# Patient Record
Sex: MALE | Race: WHITE | NOT HISPANIC OR LATINO | Employment: OTHER | URBAN - METROPOLITAN AREA
[De-identification: names, ages, dates, MRNs, and addresses within clinical notes are randomized per-mention and may not be internally consistent; named-entity substitution may affect disease eponyms.]

---

## 2022-06-24 ENCOUNTER — HOSPITAL ENCOUNTER (EMERGENCY)
Facility: HOSPITAL | Age: 67
Discharge: HOME | End: 2022-06-24
Attending: EMERGENCY MEDICINE
Payer: MEDICARE

## 2022-06-24 VITALS
BODY MASS INDEX: 44.1 KG/M2 | HEART RATE: 70 BPM | OXYGEN SATURATION: 100 % | WEIGHT: 315 LBS | HEIGHT: 71 IN | SYSTOLIC BLOOD PRESSURE: 140 MMHG | RESPIRATION RATE: 34 BRPM | DIASTOLIC BLOOD PRESSURE: 95 MMHG | TEMPERATURE: 98 F

## 2022-06-24 DIAGNOSIS — N39.0 URINARY TRACT INFECTION WITHOUT HEMATURIA, SITE UNSPECIFIED: ICD-10-CM

## 2022-06-24 DIAGNOSIS — R55 NEAR SYNCOPE: ICD-10-CM

## 2022-06-24 DIAGNOSIS — I16.0 HYPERTENSIVE URGENCY: Primary | ICD-10-CM

## 2022-06-24 LAB
ALBUMIN SERPL BCP-MCNC: 4 G/DL (ref 3.5–5.2)
ALP SERPL-CCNC: 108 U/L (ref 55–135)
ALT SERPL W/O P-5'-P-CCNC: 24 U/L (ref 10–44)
ANION GAP SERPL CALC-SCNC: 10 MMOL/L (ref 8–16)
AST SERPL-CCNC: 18 U/L (ref 10–40)
BACTERIA #/AREA URNS HPF: ABNORMAL /HPF
BASOPHILS # BLD AUTO: 0.05 K/UL (ref 0–0.2)
BASOPHILS NFR BLD: 0.6 % (ref 0–1.9)
BILIRUB SERPL-MCNC: 0.6 MG/DL (ref 0.1–1)
BILIRUB UR QL STRIP: NEGATIVE
BUN SERPL-MCNC: 22 MG/DL (ref 8–23)
CALCIUM SERPL-MCNC: 10.4 MG/DL (ref 8.7–10.5)
CHLORIDE SERPL-SCNC: 106 MMOL/L (ref 95–110)
CLARITY UR: CLEAR
CO2 SERPL-SCNC: 24 MMOL/L (ref 23–29)
COLOR UR: YELLOW
CREAT SERPL-MCNC: 1.7 MG/DL (ref 0.5–1.4)
DIFFERENTIAL METHOD: ABNORMAL
EOSINOPHIL # BLD AUTO: 0.2 K/UL (ref 0–0.5)
EOSINOPHIL NFR BLD: 2.7 % (ref 0–8)
ERYTHROCYTE [DISTWIDTH] IN BLOOD BY AUTOMATED COUNT: 13.1 % (ref 11.5–14.5)
EST. GFR  (AFRICAN AMERICAN): 47.9 ML/MIN/1.73 M^2
EST. GFR  (NON AFRICAN AMERICAN): 41.4 ML/MIN/1.73 M^2
GLUCOSE SERPL-MCNC: 204 MG/DL (ref 70–110)
GLUCOSE UR QL STRIP: NEGATIVE
HCT VFR BLD AUTO: 44.3 % (ref 40–54)
HGB BLD-MCNC: 14.6 G/DL (ref 14–18)
HGB UR QL STRIP: NEGATIVE
IMM GRANULOCYTES # BLD AUTO: 0.04 K/UL (ref 0–0.04)
IMM GRANULOCYTES NFR BLD AUTO: 0.5 % (ref 0–0.5)
KETONES UR QL STRIP: NEGATIVE
LEUKOCYTE ESTERASE UR QL STRIP: ABNORMAL
LYMPHOCYTES # BLD AUTO: 2.6 K/UL (ref 1–4.8)
LYMPHOCYTES NFR BLD: 34.1 % (ref 18–48)
MAGNESIUM SERPL-MCNC: 1.9 MG/DL (ref 1.6–2.6)
MCH RBC QN AUTO: 31.2 PG (ref 27–31)
MCHC RBC AUTO-ENTMCNC: 33 G/DL (ref 32–36)
MCV RBC AUTO: 95 FL (ref 82–98)
MICROSCOPIC COMMENT: ABNORMAL
MONOCYTES # BLD AUTO: 0.7 K/UL (ref 0.3–1)
MONOCYTES NFR BLD: 9.5 % (ref 4–15)
NEUTROPHILS # BLD AUTO: 4.1 K/UL (ref 1.8–7.7)
NEUTROPHILS NFR BLD: 52.6 % (ref 38–73)
NITRITE UR QL STRIP: NEGATIVE
NRBC BLD-RTO: 0 /100 WBC
PH UR STRIP: 6 [PH] (ref 5–8)
PLATELET # BLD AUTO: 227 K/UL (ref 150–450)
PMV BLD AUTO: 9.8 FL (ref 9.2–12.9)
POCT GLUCOSE: 210 MG/DL (ref 70–110)
POTASSIUM SERPL-SCNC: 3.5 MMOL/L (ref 3.5–5.1)
PROT SERPL-MCNC: 7.4 G/DL (ref 6–8.4)
PROT UR QL STRIP: NEGATIVE
RBC # BLD AUTO: 4.68 M/UL (ref 4.6–6.2)
RBC #/AREA URNS HPF: 2 /HPF (ref 0–4)
SODIUM SERPL-SCNC: 140 MMOL/L (ref 136–145)
SP GR UR STRIP: 1.02 (ref 1–1.03)
TROPONIN I SERPL DL<=0.01 NG/ML-MCNC: <0.006 NG/ML (ref 0–0.03)
URN SPEC COLLECT METH UR: ABNORMAL
UROBILINOGEN UR STRIP-ACNC: NEGATIVE EU/DL
WBC # BLD AUTO: 7.75 K/UL (ref 3.9–12.7)
WBC #/AREA URNS HPF: 6 /HPF (ref 0–5)

## 2022-06-24 PROCEDURE — 83735 ASSAY OF MAGNESIUM: CPT | Performed by: EMERGENCY MEDICINE

## 2022-06-24 PROCEDURE — 71045 XR CHEST 1 VIEW: ICD-10-PCS | Mod: 26,,, | Performed by: RADIOLOGY

## 2022-06-24 PROCEDURE — 80053 COMPREHEN METABOLIC PANEL: CPT | Performed by: EMERGENCY MEDICINE

## 2022-06-24 PROCEDURE — 71045 X-RAY EXAM CHEST 1 VIEW: CPT | Mod: 26,,, | Performed by: RADIOLOGY

## 2022-06-24 PROCEDURE — 70450 CT HEAD WITHOUT CONTRAST: ICD-10-PCS | Mod: 26,,, | Performed by: RADIOLOGY

## 2022-06-24 PROCEDURE — 81000 URINALYSIS NONAUTO W/SCOPE: CPT | Performed by: EMERGENCY MEDICINE

## 2022-06-24 PROCEDURE — 93005 ELECTROCARDIOGRAM TRACING: CPT

## 2022-06-24 PROCEDURE — 70450 CT HEAD/BRAIN W/O DYE: CPT | Mod: 26,,, | Performed by: RADIOLOGY

## 2022-06-24 PROCEDURE — 99285 EMERGENCY DEPT VISIT HI MDM: CPT | Mod: 25

## 2022-06-24 PROCEDURE — 96361 HYDRATE IV INFUSION ADD-ON: CPT

## 2022-06-24 PROCEDURE — 96375 TX/PRO/DX INJ NEW DRUG ADDON: CPT

## 2022-06-24 PROCEDURE — 85025 COMPLETE CBC W/AUTO DIFF WBC: CPT | Performed by: EMERGENCY MEDICINE

## 2022-06-24 PROCEDURE — 93010 EKG 12-LEAD: ICD-10-PCS | Mod: ,,, | Performed by: INTERNAL MEDICINE

## 2022-06-24 PROCEDURE — 70450 CT HEAD/BRAIN W/O DYE: CPT | Mod: TC

## 2022-06-24 PROCEDURE — 71045 X-RAY EXAM CHEST 1 VIEW: CPT | Mod: TC,FY

## 2022-06-24 PROCEDURE — 25000003 PHARM REV CODE 250: Performed by: EMERGENCY MEDICINE

## 2022-06-24 PROCEDURE — 96374 THER/PROPH/DIAG INJ IV PUSH: CPT

## 2022-06-24 PROCEDURE — 84484 ASSAY OF TROPONIN QUANT: CPT | Performed by: EMERGENCY MEDICINE

## 2022-06-24 PROCEDURE — 63600175 PHARM REV CODE 636 W HCPCS: Performed by: EMERGENCY MEDICINE

## 2022-06-24 PROCEDURE — 93010 ELECTROCARDIOGRAM REPORT: CPT | Mod: ,,, | Performed by: INTERNAL MEDICINE

## 2022-06-24 RX ORDER — CLONIDINE HYDROCHLORIDE 0.1 MG/1
0.1 TABLET ORAL
Status: COMPLETED | OUTPATIENT
Start: 2022-06-24 | End: 2022-06-24

## 2022-06-24 RX ORDER — HYDROCODONE BITARTRATE AND ACETAMINOPHEN 10; 325 MG/1; MG/1
TABLET ORAL
COMMUNITY
Start: 2022-06-17 | End: 2022-10-06

## 2022-06-24 RX ORDER — NALOXONE HCL 0.4 MG/ML
1 VIAL (ML) INJECTION
Status: COMPLETED | OUTPATIENT
Start: 2022-06-24 | End: 2022-06-24

## 2022-06-24 RX ORDER — COLCHICINE 0.6 MG/1
CAPSULE ORAL
COMMUNITY
Start: 2022-05-19

## 2022-06-24 RX ORDER — PRAVASTATIN SODIUM 20 MG/1
1 TABLET ORAL NIGHTLY
COMMUNITY
Start: 2022-06-17

## 2022-06-24 RX ORDER — NITROFURANTOIN 25; 75 MG/1; MG/1
100 CAPSULE ORAL 2 TIMES DAILY
Qty: 14 CAPSULE | Refills: 0 | Status: SHIPPED | OUTPATIENT
Start: 2022-06-24 | End: 2022-07-01

## 2022-06-24 RX ORDER — CHOLECALCIFEROL (VITAMIN D3) 25 MCG
1000 TABLET ORAL
COMMUNITY

## 2022-06-24 RX ORDER — ALLOPURINOL 100 MG/1
200 TABLET ORAL
COMMUNITY
Start: 2022-06-17 | End: 2023-06-17

## 2022-06-24 RX ORDER — FUROSEMIDE 40 MG/1
40 TABLET ORAL DAILY PRN
COMMUNITY
Start: 2022-06-17

## 2022-06-24 RX ORDER — ONDANSETRON 2 MG/ML
4 INJECTION INTRAMUSCULAR; INTRAVENOUS
Status: COMPLETED | OUTPATIENT
Start: 2022-06-24 | End: 2022-06-24

## 2022-06-24 RX ORDER — BENAZEPRIL HYDROCHLORIDE 20 MG/1
20 TABLET ORAL 2 TIMES DAILY
COMMUNITY
Start: 2022-05-19

## 2022-06-24 RX ORDER — METOPROLOL SUCCINATE 25 MG/1
25 TABLET, EXTENDED RELEASE ORAL DAILY
COMMUNITY
Start: 2022-05-18

## 2022-06-24 RX ORDER — APIXABAN 5 MG/1
5 TABLET, FILM COATED ORAL 2 TIMES DAILY
COMMUNITY
Start: 2022-06-14

## 2022-06-24 RX ORDER — HYDRALAZINE HYDROCHLORIDE 20 MG/ML
10 INJECTION INTRAMUSCULAR; INTRAVENOUS
Status: COMPLETED | OUTPATIENT
Start: 2022-06-24 | End: 2022-06-24

## 2022-06-24 RX ORDER — GLIPIZIDE 5 MG/1
5 TABLET, FILM COATED, EXTENDED RELEASE ORAL DAILY
COMMUNITY
Start: 2022-04-25

## 2022-06-24 RX ADMIN — ONDANSETRON 4 MG: 2 INJECTION INTRAMUSCULAR; INTRAVENOUS at 11:06

## 2022-06-24 RX ADMIN — SODIUM CHLORIDE 500 ML: 9 INJECTION, SOLUTION INTRAVENOUS at 11:06

## 2022-06-24 RX ADMIN — NALOXONE HYDROCHLORIDE 1 MG: 0.4 INJECTION, SOLUTION INTRAMUSCULAR; INTRAVENOUS; SUBCUTANEOUS at 11:06

## 2022-06-24 RX ADMIN — CLONIDINE HYDROCHLORIDE 0.1 MG: 0.1 TABLET ORAL at 12:06

## 2022-06-24 RX ADMIN — HYDRALAZINE HYDROCHLORIDE 10 MG: 20 INJECTION, SOLUTION INTRAMUSCULAR; INTRAVENOUS at 12:06

## 2022-06-24 NOTE — ED TRIAGE NOTES
Pt states that this morning he took his medications out of his organizer pt states that he believes he took the wrong organizer slot and took 2 extra oxycodone 5mg. Pt states that he is having increased sweating and that he has a headache. Pt is diaphoretic in triage. Pt states that he also has nausea.

## 2022-06-24 NOTE — ED PROVIDER NOTES
Encounter Date: 6/24/2022       History     Chief Complaint   Patient presents with    took wrong medication     Pt with hx of HTN here with c/o nausea, lightheadedness and diaphoresis not long after accidentally taking 2 extra pain pills this am. No LOC. He feels like he will pass out when he stands. No CP/SOB. No focal weakness or numbness.     The history is provided by the patient.     Review of patient's allergies indicates:  No Known Allergies  No past medical history on file.  No past surgical history on file.  No family history on file.     Review of Systems   Constitutional: Positive for diaphoresis. Negative for chills and fever.   HENT: Negative for sore throat and tinnitus.    Eyes: Negative for photophobia.   Respiratory: Negative for shortness of breath.    Cardiovascular: Negative for chest pain, palpitations and leg swelling.   Gastrointestinal: Negative for abdominal pain.   Endocrine: Negative for polydipsia and polyuria.   Genitourinary: Negative for decreased urine volume.   Musculoskeletal: Negative for back pain.   Neurological: Positive for dizziness and light-headedness. Negative for seizures, syncope and headaches.   All other systems reviewed and are negative.      Physical Exam     Initial Vitals   BP Pulse Resp Temp SpO2   06/24/22 1041 06/24/22 1037 06/24/22 1037 06/24/22 1041 06/24/22 1037   (!) 192/117 77 18 97.9 °F (36.6 °C) 96 %      MAP       --                Physical Exam    Constitutional: He appears well-developed and well-nourished. He is diaphoretic. No distress.   Mildly ill appearing obese male   HENT:   Mouth/Throat: Oropharynx is clear and moist.   Eyes: Conjunctivae and EOM are normal. Pupils are equal, round, and reactive to light. No scleral icterus.   Neck: Neck supple. No thyromegaly present.   Normal range of motion.  Cardiovascular: Normal rate, regular rhythm, normal heart sounds and intact distal pulses.   Pulmonary/Chest: Breath sounds normal.   Abdominal:  Abdomen is soft. There is no abdominal tenderness.   Musculoskeletal:         General: No tenderness or edema. Normal range of motion.      Cervical back: Normal range of motion and neck supple.     Neurological: He is alert and oriented to person, place, and time. GCS score is 15. GCS eye subscore is 4. GCS verbal subscore is 5. GCS motor subscore is 6.   Skin: Skin is warm. Capillary refill takes less than 2 seconds. There is pallor.         ED Course   Procedures  Labs Reviewed   CBC W/ AUTO DIFFERENTIAL - Abnormal; Notable for the following components:       Result Value    MCH 31.2 (*)     All other components within normal limits   COMPREHENSIVE METABOLIC PANEL - Abnormal; Notable for the following components:    Glucose 204 (*)     Creatinine 1.7 (*)     eGFR if  47.9 (*)     eGFR if non  41.4 (*)     All other components within normal limits   URINALYSIS, REFLEX TO URINE CULTURE - Abnormal; Notable for the following components:    Leukocytes, UA Trace (*)     All other components within normal limits    Narrative:     Preferred Collection Type->Urine, Clean Catch  Specimen Source->Urine   URINALYSIS MICROSCOPIC - Abnormal; Notable for the following components:    WBC, UA 6 (*)     All other components within normal limits    Narrative:     Preferred Collection Type->Urine, Clean Catch  Specimen Source->Urine   POCT GLUCOSE - Abnormal; Notable for the following components:    POCT Glucose 210 (*)     All other components within normal limits   MAGNESIUM   TROPONIN I     EKG Readings: (Independently Interpreted)   Rhythm: Atrial Fibrillation. Heart Rate: 56. Ectopy: No Ectopy. Conduction: Normal. ST Segments: Normal ST Segments. T Waves: Normal. Clinical Impression: Atrial Fibrillation       Imaging Results          CT Head Without Contrast (Final result)  Result time 06/24/22 12:47:13    Final result by Chantel Garcia MD (06/24/22 12:47:13)                 Impression:      No  acute abnormality.  Minor changes of chronic microvascular ischemia.      Electronically signed by: Chantel Garcia  Date:    06/24/2022  Time:    12:47             Narrative:    EXAMINATION:  CT HEAD WITHOUT CONTRAST    CLINICAL HISTORY:  Mental status change, unknown cause; near syncope    TECHNIQUE:  Low dose axial images were obtained through the head.  Coronal and sagittal reformations were also performed. Contrast was not administered.    COMPARISON:  None    FINDINGS:  There is no intra or extra-axial hemorrhage.  No mass effect, edema or midline shift is present.  The ventricular system and cortical sulcal markings are appropriate for the patient's age.  There is no acute or chronic infarction.  Minor periventricular low densities of chronic microvascular ischemia are present.    There is no skull fracture.  The visualized paranasal sinuses are clear.                               X-Ray Chest 1 View (Final result)  Result time 06/24/22 11:49:31    Final result by Chantel Garcia MD (06/24/22 11:49:31)                 Impression:      Cardiomegaly and pulmonary venous hypertension.      Electronically signed by: Chantel Garcia  Date:    06/24/2022  Time:    11:49             Narrative:    EXAMINATION:  XR CHEST 1 VIEW    CLINICAL HISTORY:  Syncope and collapse    TECHNIQUE:  Single frontal view of the chest was performed.    COMPARISON:  None    FINDINGS:  There is enlargement of the cardiac silhouette.  Cephalization of pulmonary blood flow is noted without shayan pulmonary edema.  Lungs are otherwise clear.  There is no pleural effusion or pneumothorax.                                 Medications   ondansetron injection 4 mg (4 mg Intravenous Given 6/24/22 1133)   sodium chloride 0.9% bolus 500 mL (500 mLs Intravenous New Bag 6/24/22 1130)   naloxone 0.4 mg/mL injection 1 mg (1 mg Intravenous Given 6/24/22 1136)   hydrALAZINE injection 10 mg (10 mg Intravenous Given 6/24/22 1246)   cloNIDine  tablet 0.1 mg (0.1 mg Oral Given 6/24/22 1245)     Medical Decision Making:   Clinical Tests:   Lab Tests: Ordered  Radiological Study: Ordered  Medical Tests: Ordered  ED Management:  Pt presented after near syncope after accidentally taking extra narcotic pills this am. Pt initially pale and diaphoretic. He improved with IVFs and narcan. CBC, CMP, troponin and EKG all unremarkable. His UA c/w mild UTI. Pt's color returned to normal and his symptoms resolved. His BP remained elevated and he was given hydralazine and clonidine with improvement. Pt remained asymptomatic the rest of his stay. I offered to keep him over night for observation but pt stated that he felt much better and wanted to go home. Return precautions discussed. He was advised to f/u with his PCP early next week for BP med mgt.             ED Course as of 06/24/22 1331   Fri Jun 24, 2022   1057 Likely vagal 2/2 the opiate. Plan labs and small NS bolus. EKG pending. [DC]   1113 Pt now in Afib. He reports hx of same and takes eliquis. [DC]   1233 Pt feeling back to baseline. His color is now normal. He remains hypertensive, now in 200s. Will treat this.  [DC]   1239 Pt ambulated to RR without difficulty. No further dizziness. [DC]      ED Course User Index  [DC] Sidney Bynum Jr., MD             Clinical Impression:   Final diagnoses:  [R55] Near syncope  [I16.0] Hypertensive urgency (Primary)  [N39.0] Urinary tract infection without hematuria, site unspecified          ED Disposition Condition    Discharge Stable        ED Prescriptions     Medication Sig Dispense Start Date End Date Auth. Provider    nitrofurantoin, macrocrystal-monohydrate, (MACROBID) 100 MG capsule Take 1 capsule (100 mg total) by mouth 2 (two) times daily. for 7 days 14 capsule 6/24/2022 7/1/2022 Sidney Bynum Jr., MD        Follow-up Information     Follow up With Specialties Details Why Contact Info    Rosalinda Stockton NP Family Medicine Schedule an appointment as soon as  possible for a visit in 3 days  [address]  [place] [address]  [phone]             Sidney Bynum Jr., MD  06/24/22 1179

## 2022-08-16 ENCOUNTER — OFFICE VISIT (OUTPATIENT)
Dept: FAMILY MEDICINE | Facility: CLINIC | Age: 67
End: 2022-08-16
Payer: MEDICARE

## 2022-08-16 VITALS
HEIGHT: 71 IN | WEIGHT: 315 LBS | RESPIRATION RATE: 18 BRPM | DIASTOLIC BLOOD PRESSURE: 80 MMHG | BODY MASS INDEX: 44.1 KG/M2 | OXYGEN SATURATION: 95 % | SYSTOLIC BLOOD PRESSURE: 128 MMHG | HEART RATE: 61 BPM

## 2022-08-16 DIAGNOSIS — G89.29 CHRONIC PAIN OF LEFT KNEE: ICD-10-CM

## 2022-08-16 DIAGNOSIS — M17.12 PRIMARY OSTEOARTHRITIS OF LEFT KNEE: Primary | ICD-10-CM

## 2022-08-16 DIAGNOSIS — M25.562 CHRONIC PAIN OF LEFT KNEE: ICD-10-CM

## 2022-08-16 PROCEDURE — 4010F ACE/ARB THERAPY RXD/TAKEN: CPT | Mod: CPTII,S$GLB,, | Performed by: FAMILY MEDICINE

## 2022-08-16 PROCEDURE — 99203 PR OFFICE/OUTPT VISIT, NEW, LEVL III, 30-44 MIN: ICD-10-PCS | Mod: S$GLB,,, | Performed by: FAMILY MEDICINE

## 2022-08-16 PROCEDURE — 1159F MED LIST DOCD IN RCRD: CPT | Mod: CPTII,S$GLB,, | Performed by: FAMILY MEDICINE

## 2022-08-16 PROCEDURE — 1101F PR PT FALLS ASSESS DOC 0-1 FALLS W/OUT INJ PAST YR: ICD-10-PCS | Mod: CPTII,S$GLB,, | Performed by: FAMILY MEDICINE

## 2022-08-16 PROCEDURE — 3008F PR BODY MASS INDEX (BMI) DOCUMENTED: ICD-10-PCS | Mod: CPTII,S$GLB,, | Performed by: FAMILY MEDICINE

## 2022-08-16 PROCEDURE — 3079F PR MOST RECENT DIASTOLIC BLOOD PRESSURE 80-89 MM HG: ICD-10-PCS | Mod: CPTII,S$GLB,, | Performed by: FAMILY MEDICINE

## 2022-08-16 PROCEDURE — 4010F PR ACE/ARB THEARPY RXD/TAKEN: ICD-10-PCS | Mod: CPTII,S$GLB,, | Performed by: FAMILY MEDICINE

## 2022-08-16 PROCEDURE — 1159F PR MEDICATION LIST DOCUMENTED IN MEDICAL RECORD: ICD-10-PCS | Mod: CPTII,S$GLB,, | Performed by: FAMILY MEDICINE

## 2022-08-16 PROCEDURE — 3008F BODY MASS INDEX DOCD: CPT | Mod: CPTII,S$GLB,, | Performed by: FAMILY MEDICINE

## 2022-08-16 PROCEDURE — 99999 PR PBB SHADOW E&M-EST. PATIENT-LVL IV: ICD-10-PCS | Mod: PBBFAC,,, | Performed by: FAMILY MEDICINE

## 2022-08-16 PROCEDURE — 3288F FALL RISK ASSESSMENT DOCD: CPT | Mod: CPTII,S$GLB,, | Performed by: FAMILY MEDICINE

## 2022-08-16 PROCEDURE — 99203 OFFICE O/P NEW LOW 30 MIN: CPT | Mod: S$GLB,,, | Performed by: FAMILY MEDICINE

## 2022-08-16 PROCEDURE — 99999 PR PBB SHADOW E&M-EST. PATIENT-LVL IV: CPT | Mod: PBBFAC,,, | Performed by: FAMILY MEDICINE

## 2022-08-16 PROCEDURE — 3288F PR FALLS RISK ASSESSMENT DOCUMENTED: ICD-10-PCS | Mod: CPTII,S$GLB,, | Performed by: FAMILY MEDICINE

## 2022-08-16 PROCEDURE — 3079F DIAST BP 80-89 MM HG: CPT | Mod: CPTII,S$GLB,, | Performed by: FAMILY MEDICINE

## 2022-08-16 PROCEDURE — 3074F SYST BP LT 130 MM HG: CPT | Mod: CPTII,S$GLB,, | Performed by: FAMILY MEDICINE

## 2022-08-16 PROCEDURE — 3074F PR MOST RECENT SYSTOLIC BLOOD PRESSURE < 130 MM HG: ICD-10-PCS | Mod: CPTII,S$GLB,, | Performed by: FAMILY MEDICINE

## 2022-08-16 PROCEDURE — 1101F PT FALLS ASSESS-DOCD LE1/YR: CPT | Mod: CPTII,S$GLB,, | Performed by: FAMILY MEDICINE

## 2022-08-16 NOTE — PROGRESS NOTES
[facility] - Clinic Note    Subjective      Mr. Barnes is a 67 y.o. male who presents to clinic for knee pain.     Complains of chronic left knee pain  Present for the past few years.   Reports that he has undergone joint steroid injections as well as rooster cane shots with no improvement.   He needs a total knee replacement but needs to lose weight first.   Reports that he is given about 20-30 norcos a month which do not last him and one tablet will usually help for the pain for about 2-3 hours.   He is frustrated as he thought he was seeing pain management today.  Last x-ray was in 4/2022 and revealed: Again noted is severe narrowing of the medial joint space with medial lateral marginal osteophytes. Prominent patellofemoral joint space narrowing and osteophytes. No acute fracture or dislocation. No joint effusion. Soft tissues normal.        PMH Bridgette has a past medical history of Atrial fibrillation, Gout, unspecified, Hyperlipidemia, and Hypertension.   PSXH Bridgette has a past surgical history that includes Iliac artery stent.    Bridgette's family history includes Cancer in his father and mother; Lung cancer in his father and mother.   SH Bridgette reports that he has never smoked. He has never used smokeless tobacco. He reports previous alcohol use. He reports that he does not use drugs.   ALG Bridgette has No Known Allergies.   MED Bridgette has a current medication list which includes the following prescription(s): allopurinol, benazepril, eliquis, furosemide, glipizide, hydrocodone-acetaminophen, metoprolol succinate, mitigare, pravastatin, and vitamin d.     Review of Systems   Constitutional: Negative for activity change, appetite change, chills, fatigue and fever.   Eyes: Negative for visual disturbance.   Respiratory: Negative for cough and shortness of breath.    Cardiovascular: Negative for chest pain, palpitations and leg swelling.   Gastrointestinal: Negative for abdominal pain, nausea and vomiting.  "  Musculoskeletal: Positive for arthralgias.        Chronic left knee pain   Skin: Negative for wound.   Neurological: Negative for dizziness and headaches.   Psychiatric/Behavioral: Negative for confusion.     Objective     /80 (BP Location: Left arm, Patient Position: Sitting, BP Method: Medium (Manual))   Pulse 61   Resp 18   Ht 5' 11" (1.803 m)   Wt (!) 179.2 kg (395 lb)   SpO2 95%   BMI 55.09 kg/m²     Physical Exam   Constitutional: He appears obese.  Non-toxic appearance. He does not appear ill. No distress.   HENT:   Head: Normocephalic and atraumatic.   Eyes: Right eye exhibits no discharge. Left eye exhibits no discharge.   Cardiovascular: Normal rate, regular rhythm, normal heart sounds and normal pulses. Exam reveals no gallop and no friction rub.   No murmur heard.Pulmonary:      Effort: Pulmonary effort is normal. No respiratory distress.      Breath sounds: Normal breath sounds. No wheezing, rhonchi or rales.     Abdominal: Normal appearance.   Musculoskeletal:      Right lower leg: No edema.      Left lower leg: No edema.   Lymphadenopathy:     He has no cervical adenopathy.   Neurological: He is alert.   Skin: Skin is warm and dry. Capillary refill takes less than 2 seconds. He is not diaphoretic.   Psychiatric: His behavior is normal. Mood, judgment and thought content normal.   Vitals reviewed.     Assessment/Plan     Bridgette was seen today for knee pain.    Diagnoses and all orders for this visit:    Primary osteoarthritis of left knee  -     Ambulatory referral/consult to Pain Clinic; Future    Chronic pain of left knee  -     Ambulatory referral/consult to Pain Clinic; Future      -patient is new to me.   -refer to pain management. appt scheduled for patient.   -he will continue care with his primary physician.     Future Appointments   Date Time Provider Department Center   10/6/2022 10:30 AM Reinaldo Burgos MD [address]       William Hernandez MD  Family " Medicine  [facility]

## 2022-09-09 ENCOUNTER — OFFICE VISIT (OUTPATIENT)
Dept: PAIN MEDICINE | Facility: CLINIC | Age: 67
End: 2022-09-09
Payer: MEDICARE

## 2022-09-09 VITALS
DIASTOLIC BLOOD PRESSURE: 84 MMHG | SYSTOLIC BLOOD PRESSURE: 172 MMHG | WEIGHT: 315 LBS | BODY MASS INDEX: 44.1 KG/M2 | HEIGHT: 71 IN | HEART RATE: 61 BPM

## 2022-09-09 DIAGNOSIS — M25.562 CHRONIC PAIN OF LEFT KNEE: ICD-10-CM

## 2022-09-09 DIAGNOSIS — M17.12 PRIMARY OSTEOARTHRITIS OF LEFT KNEE: Primary | ICD-10-CM

## 2022-09-09 DIAGNOSIS — G89.29 CHRONIC PAIN OF LEFT KNEE: ICD-10-CM

## 2022-09-09 PROCEDURE — 99999 PR PBB SHADOW E&M-EST. PATIENT-LVL III: CPT | Mod: PBBFAC,,, | Performed by: ANESTHESIOLOGY

## 2022-09-09 PROCEDURE — 99204 OFFICE O/P NEW MOD 45 MIN: CPT | Mod: S$GLB,,, | Performed by: ANESTHESIOLOGY

## 2022-09-09 PROCEDURE — 3288F FALL RISK ASSESSMENT DOCD: CPT | Mod: CPTII,S$GLB,, | Performed by: ANESTHESIOLOGY

## 2022-09-09 PROCEDURE — 99204 PR OFFICE/OUTPT VISIT, NEW, LEVL IV, 45-59 MIN: ICD-10-PCS | Mod: S$GLB,,, | Performed by: ANESTHESIOLOGY

## 2022-09-09 PROCEDURE — 3288F PR FALLS RISK ASSESSMENT DOCUMENTED: ICD-10-PCS | Mod: CPTII,S$GLB,, | Performed by: ANESTHESIOLOGY

## 2022-09-09 PROCEDURE — 3079F PR MOST RECENT DIASTOLIC BLOOD PRESSURE 80-89 MM HG: ICD-10-PCS | Mod: CPTII,S$GLB,, | Performed by: ANESTHESIOLOGY

## 2022-09-09 PROCEDURE — 1101F PT FALLS ASSESS-DOCD LE1/YR: CPT | Mod: CPTII,S$GLB,, | Performed by: ANESTHESIOLOGY

## 2022-09-09 PROCEDURE — 3077F SYST BP >= 140 MM HG: CPT | Mod: CPTII,S$GLB,, | Performed by: ANESTHESIOLOGY

## 2022-09-09 PROCEDURE — 3079F DIAST BP 80-89 MM HG: CPT | Mod: CPTII,S$GLB,, | Performed by: ANESTHESIOLOGY

## 2022-09-09 PROCEDURE — 99999 PR PBB SHADOW E&M-EST. PATIENT-LVL III: ICD-10-PCS | Mod: PBBFAC,,, | Performed by: ANESTHESIOLOGY

## 2022-09-09 PROCEDURE — 3008F PR BODY MASS INDEX (BMI) DOCUMENTED: ICD-10-PCS | Mod: CPTII,S$GLB,, | Performed by: ANESTHESIOLOGY

## 2022-09-09 PROCEDURE — 3008F BODY MASS INDEX DOCD: CPT | Mod: CPTII,S$GLB,, | Performed by: ANESTHESIOLOGY

## 2022-09-09 PROCEDURE — 3077F PR MOST RECENT SYSTOLIC BLOOD PRESSURE >= 140 MM HG: ICD-10-PCS | Mod: CPTII,S$GLB,, | Performed by: ANESTHESIOLOGY

## 2022-09-09 PROCEDURE — 4010F ACE/ARB THERAPY RXD/TAKEN: CPT | Mod: CPTII,S$GLB,, | Performed by: ANESTHESIOLOGY

## 2022-09-09 PROCEDURE — 4010F PR ACE/ARB THEARPY RXD/TAKEN: ICD-10-PCS | Mod: CPTII,S$GLB,, | Performed by: ANESTHESIOLOGY

## 2022-09-09 PROCEDURE — 1101F PR PT FALLS ASSESS DOC 0-1 FALLS W/OUT INJ PAST YR: ICD-10-PCS | Mod: CPTII,S$GLB,, | Performed by: ANESTHESIOLOGY

## 2022-09-09 RX ORDER — OXYCODONE AND ACETAMINOPHEN 10; 325 MG/1; MG/1
1 TABLET ORAL EVERY 12 HOURS PRN
Qty: 60 TABLET | Refills: 0 | Status: SHIPPED | OUTPATIENT
Start: 2022-09-09 | End: 2022-10-07 | Stop reason: SDUPTHER

## 2022-09-09 NOTE — PROGRESS NOTES
Referring Physician: Nahum Crawford    PCP: Rosalinda Stockton NP    CC: left knee pain    HPI:   Bridgette Barnes is a 67 y.o. male with PMH significant for morbid obesity, HTN, pre-diabetes, and atrial fibrillation on Eliquis presents for the evaluation of left knee pain. The patient reports that his pain began approximately since high school s/p football injury. The patient denies of prior left knee surgery. The patient reports of worsening severity since onset. The patient localizes his pain to the anterior aspect of his left knee. The patient reports of radiation down his left shin. The patient describes his pain as a sharp type of pain. The patient denies of numbness. The patient reports that his pain is a 8-10/10. Patient denies of any urinary/fecal incontinence, saddle anesthesia, or weakness.     Aggravating factors: walking, standing    Mitigating factors: pain somewhat improved after he gets moving; minimal relief with Norco  mg     Relevant Surgeries: no; Dr. Romero at [facility]; was told he had to lose weight prior to arthroplasty    Interventional Therapies: yes; steroid injections with minimal, short lived relief. Tried viscosupplementation without benefit.     : Reviewed and consistent with medication use as prescribed.      Non-pharmacologic Treatment:     Physical Therapy: yes; did but denies of benefit   Ice/Heat: yes; tried both  TENS: no  Massage: no  Chiropractic care: no  Acupuncture: no         Pain Medications:         Currently taking: Norco  mg     Has tried in the past:    Opioids: yes; patient did report of taking his daughter's oxycodone in the past with significant relief.   NSAIDS: no; avoids NSAIDs  Tylenol: yes  Muscle relaxants: no  TCAs: no  SNRIs: no  Anticonvulsants: no  topical creams: yes    Anticoagulation: yes; Eliquis    ROS:  Review of Systems   Constitutional:  Negative for chills and fever.   HENT:  Negative for tinnitus.    Eyes:  Negative  "for visual disturbance.   Respiratory:  Negative for shortness of breath.    Cardiovascular:  Negative for chest pain.   Gastrointestinal:  Negative for nausea and vomiting.   Genitourinary:  Negative for difficulty urinating.   Musculoskeletal:  Positive for arthralgias.   Skin:  Negative for rash.   Allergic/Immunologic: Negative for immunocompromised state.   Neurological:  Negative for syncope and numbness.   Hematological:  Does not bruise/bleed easily.   Psychiatric/Behavioral:  Negative for suicidal ideas.       Past Medical History:   Diagnosis Date    Atrial fibrillation     Gout, unspecified     Hyperlipidemia     Hypertension      Past Surgical History:   Procedure Laterality Date    ILIAC ARTERY STENT       Family History   Problem Relation Age of Onset    Cancer Mother     Lung cancer Mother     Cancer Father     Lung cancer Father      Social History     Socioeconomic History    Marital status: Single   Tobacco Use    Smoking status: Never    Smokeless tobacco: Never   Substance and Sexual Activity    Alcohol use: Not Currently    Drug use: Never    Sexual activity: Not Currently         Allergies: See med card    Vitals:    09/09/22 1129   BP: (!) 172/84   Pulse: 61   Weight: (!) 179.2 kg (395 lb)   Height: 5' 11" (1.803 m)   PainSc:   8   PainLoc: Knee         Physical exam:  Physical Exam  Vitals and nursing note reviewed.   Constitutional:       Appearance: Normal appearance. He is not toxic-appearing or diaphoretic.   HENT:      Head: Normocephalic and atraumatic.   Eyes:      General:         Right eye: No discharge.         Left eye: No discharge.      Extraocular Movements: Extraocular movements intact.      Conjunctiva/sclera: Conjunctivae normal.   Cardiovascular:      Rate and Rhythm: Normal rate.   Pulmonary:      Effort: Pulmonary effort is normal. No respiratory distress.      Breath sounds: Normal breath sounds.   Abdominal:      Palpations: Abdomen is soft.   Skin:     General: Skin is " "warm and dry.      Findings: No rash.   Neurological:      Mental Status: He is alert and oriented to person, place, and time.   Psychiatric:         Mood and Affect: Mood and affect normal.         Cognition and Memory: Memory normal.         Judgment: Judgment normal.        UPPER EXTREMITIES: Normal alignment, normal range of motion, no atrophy, no skin changes,  hair growth and nail growth normal and equal bilaterally. No swelling, no tenderness.    LOWER EXTREMITIES:  Normal alignment, normal range of motion, no atrophy, no skin changes,  hair growth and nail growth normal and equal bilaterally. No swelling, no tenderness.    Knee exam:    Extension/flexion equal bilaterally.   Positive crepitus with in the left knee  No effusion both knees.    Positive joint line tenderness     CRANIAL NERVES:  II:  PERRL bilaterally,   III,IV,VI: EOMI.    V:  Facial sensation equal bilaterally  VII:  Facial motor function normal.  VIII:  Hearing equal to finger rub bilaterally  IX/X: Gag normal, palate symmetric  XI:  Shoulder shrug equal, head turn equal  XII:  Tongue midline without fasciculations      MOTOR: Tone and bulk: normal     MUSCLE STRENGTH:  Hip Flexion: Right 5/5, Left 5/5  Hip Extension: Right 5/5, Left 5/5  Leg Flexion: Right 5/5, Left 5/5  Leg Extension: Right 5/5, Left 5/5  Plantar Flexion: Right 5/5, Left 5/5  Dorsiflexion: Right 5/5, Left 5/5    Delt Bi Tri     Right: 5/5 5/5 5/5 5/5   Left: 5/5 5/5 5/5 5/5     SENSATION: Light touch and pinprick intact bilaterally  REFLEXES: normal, symmetric, nonbrisk.  Toes down, no clonus. Negative corrales's sign bilaterally.  GAIT: antalgic gait; uses a single point cane for assistance with ambulation       Imaging: per last PCP visit "Last x-ray was in 4/2022 and revealed: Again noted is severe narrowing of the medial joint space with medial lateral marginal osteophytes. Prominent patellofemoral joint space narrowing and osteophytes. No acute fracture or " "dislocation. No joint effusion. Soft tissues normal."    Assessment: Bridgette Barnes is a 67 y.o. male with PMH significant for morbid obesity, HTN, pre-diabetes, and atrial fibrillation on Eliquis presents for the evaluation of left knee pain. Patient with severe OA of the left knee. Patient reports that he is unable to get a knee arthroplasty given his morbid obesity. The patient reports that previous steroid injections and viscosupplementation did not provide him with meaningful relief. Treatment plan outlined below.     Plan:  - Explained to the patient that I do not intend to manage his left knee pathology with chronic opioids for the rest of his life as that is not appropriate. I outlined that the patient must make substantial progress in regards to weight loss with progression to left knee arthroplasty within 6 months to 1 year. After which, I will no longer provide the patient with opioids. Patient in agreement.  - Prescribed Percocet  mg PO q 12 hr PRN for breakthrough pain; # 60 tablets; 0 refills.  reviewed without discrepancies.   - I have stressed the importance of physical activity and a home exercise plan to help with chronic pain and improve health.  - UDS to be collected in the future  - Consider genicular RFA in the future   - RTC in 4 weeks for follow-up, medication refill, and UDS    Thank you for referring this interesting patient, and I look forward to continuing to collaborate in his care.    Judy Galindo MD  Pain Management      "

## 2022-10-06 ENCOUNTER — OFFICE VISIT (OUTPATIENT)
Dept: SURGERY | Facility: CLINIC | Age: 67
End: 2022-10-06
Payer: MEDICARE

## 2022-10-06 VITALS
HEART RATE: 53 BPM | BODY MASS INDEX: 44.1 KG/M2 | DIASTOLIC BLOOD PRESSURE: 98 MMHG | HEIGHT: 71 IN | WEIGHT: 315 LBS | RESPIRATION RATE: 16 BRPM | TEMPERATURE: 98 F | SYSTOLIC BLOOD PRESSURE: 174 MMHG

## 2022-10-06 DIAGNOSIS — E78.5 HYPERLIPIDEMIA, UNSPECIFIED HYPERLIPIDEMIA TYPE: ICD-10-CM

## 2022-10-06 DIAGNOSIS — E66.01 MORBID OBESITY WITH BMI OF 50.0-59.9, ADULT: ICD-10-CM

## 2022-10-06 DIAGNOSIS — E66.01 MORBID OBESITY: Primary | ICD-10-CM

## 2022-10-06 DIAGNOSIS — G47.33 OSA (OBSTRUCTIVE SLEEP APNEA): ICD-10-CM

## 2022-10-06 DIAGNOSIS — N18.30 STAGE 3 CHRONIC KIDNEY DISEASE, UNSPECIFIED WHETHER STAGE 3A OR 3B CKD: ICD-10-CM

## 2022-10-06 DIAGNOSIS — M17.0 PRIMARY OSTEOARTHRITIS OF BOTH KNEES: ICD-10-CM

## 2022-10-06 DIAGNOSIS — E11.9 TYPE 2 DIABETES MELLITUS WITHOUT COMPLICATION, WITHOUT LONG-TERM CURRENT USE OF INSULIN: ICD-10-CM

## 2022-10-06 DIAGNOSIS — I10 HYPERTENSION, UNSPECIFIED TYPE: ICD-10-CM

## 2022-10-06 PROBLEM — M10.9 GOUT, UNSPECIFIED: Status: ACTIVE | Noted: 2022-10-06

## 2022-10-06 PROCEDURE — 3077F SYST BP >= 140 MM HG: CPT | Mod: CPTII,S$GLB,, | Performed by: SURGERY

## 2022-10-06 PROCEDURE — 1159F PR MEDICATION LIST DOCUMENTED IN MEDICAL RECORD: ICD-10-PCS | Mod: CPTII,S$GLB,, | Performed by: SURGERY

## 2022-10-06 PROCEDURE — 99999 PR PBB SHADOW E&M-EST. PATIENT-LVL V: ICD-10-PCS | Mod: PBBFAC,,, | Performed by: SURGERY

## 2022-10-06 PROCEDURE — 4010F ACE/ARB THERAPY RXD/TAKEN: CPT | Mod: CPTII,S$GLB,, | Performed by: SURGERY

## 2022-10-06 PROCEDURE — 3077F PR MOST RECENT SYSTOLIC BLOOD PRESSURE >= 140 MM HG: ICD-10-PCS | Mod: CPTII,S$GLB,, | Performed by: SURGERY

## 2022-10-06 PROCEDURE — 1101F PT FALLS ASSESS-DOCD LE1/YR: CPT | Mod: CPTII,S$GLB,, | Performed by: SURGERY

## 2022-10-06 PROCEDURE — 3008F BODY MASS INDEX DOCD: CPT | Mod: CPTII,S$GLB,, | Performed by: SURGERY

## 2022-10-06 PROCEDURE — 1101F PR PT FALLS ASSESS DOC 0-1 FALLS W/OUT INJ PAST YR: ICD-10-PCS | Mod: CPTII,S$GLB,, | Performed by: SURGERY

## 2022-10-06 PROCEDURE — 1159F MED LIST DOCD IN RCRD: CPT | Mod: CPTII,S$GLB,, | Performed by: SURGERY

## 2022-10-06 PROCEDURE — 3080F DIAST BP >= 90 MM HG: CPT | Mod: CPTII,S$GLB,, | Performed by: SURGERY

## 2022-10-06 PROCEDURE — 99999 PR PBB SHADOW E&M-EST. PATIENT-LVL V: CPT | Mod: PBBFAC,,, | Performed by: SURGERY

## 2022-10-06 PROCEDURE — 3008F PR BODY MASS INDEX (BMI) DOCUMENTED: ICD-10-PCS | Mod: CPTII,S$GLB,, | Performed by: SURGERY

## 2022-10-06 PROCEDURE — 3288F FALL RISK ASSESSMENT DOCD: CPT | Mod: CPTII,S$GLB,, | Performed by: SURGERY

## 2022-10-06 PROCEDURE — 99205 OFFICE O/P NEW HI 60 MIN: CPT | Mod: S$GLB,,, | Performed by: SURGERY

## 2022-10-06 PROCEDURE — 4010F PR ACE/ARB THEARPY RXD/TAKEN: ICD-10-PCS | Mod: CPTII,S$GLB,, | Performed by: SURGERY

## 2022-10-06 PROCEDURE — 3288F PR FALLS RISK ASSESSMENT DOCUMENTED: ICD-10-PCS | Mod: CPTII,S$GLB,, | Performed by: SURGERY

## 2022-10-06 PROCEDURE — 99205 PR OFFICE/OUTPT VISIT, NEW, LEVL V, 60-74 MIN: ICD-10-PCS | Mod: S$GLB,,, | Performed by: SURGERY

## 2022-10-06 PROCEDURE — 3080F PR MOST RECENT DIASTOLIC BLOOD PRESSURE >= 90 MM HG: ICD-10-PCS | Mod: CPTII,S$GLB,, | Performed by: SURGERY

## 2022-10-06 NOTE — PROGRESS NOTES
Initial Consult    Chief Complaint   Patient presents with    Obesity       History of Present Illness:  Patient is a 67 y.o. male who is referred for evaluation of surgical treatment of morbid obesity. His Body mass index is 55.92 kg/m². He has known comorbidities of diabetes mellitus, dyslipidemia, hypertension, obstructive sleep apnea, and osteoarthritis. He has attended the bariatric seminar and is most interested in gastric sleeve surgery.      Past attempts at weight loss include: Unsupervised: na;  Supervised:  optivia, ;  Diet pills: fastin;  Exercise attempts: stationary cycle, swimming     Weight history:   At current weight:  5 years  Obese for 17 years.  More than 35 pounds overweight for 25 years.  More than 100 pounds overweight for 15 years.  Started dieting at 65 years old.  Maximum weight reached: 400 pounds  Most weight lost was 77 pounds through optavia for 5 years.  He describes His eating habits as volume eater, sweet eater, snacker/grazer, binge eater, emotional eater.    AGUILA screening: has mask and machine        Review of patient's allergies indicates:  No Known Allergies    Current Outpatient Medications   Medication Sig Dispense Refill    allopurinoL (ZYLOPRIM) 100 MG tablet Take 200 mg by mouth.      benazepriL (LOTENSIN) 20 MG tablet Take 20 mg by mouth 2 (two) times daily.      ELIQUIS 5 mg Tab Take 5 mg by mouth 2 (two) times daily.      furosemide (LASIX) 40 MG tablet Take 40 mg by mouth daily as needed.      glipiZIDE (GLUCOTROL) 5 MG TR24 Take 5 mg by mouth once daily.      metoprolol succinate (TOPROL-XL) 25 MG 24 hr tablet Take 25 mg by mouth once daily.      MITIGARE 0.6 mg Cap Take by mouth.      oxyCODONE-acetaminophen (PERCOCET)  mg per tablet Take 1 tablet by mouth every 12 (twelve) hours as needed for Pain. 60 tablet 0    pravastatin (PRAVACHOL) 20 MG tablet Take 1 tablet by mouth nightly.      vitamin D (VITAMIN D3) 1000 units Tab Take 1,000 Units by  "mouth.      HYDROcodone-acetaminophen (NORCO)  mg per tablet Take by mouth.       No current facility-administered medications for this visit.       Past Medical History:   Diagnosis Date    Atrial fibrillation     CKD (chronic kidney disease), stage III     Diabetes mellitus     Gout, unspecified     Hyperlipidemia     Hypertension     Sleep apnea      Past Surgical History:   Procedure Laterality Date    ILIAC ARTERY STENT       Family History   Problem Relation Age of Onset    Cancer Mother     Lung cancer Mother     Cancer Father     Lung cancer Father      Social History     Tobacco Use    Smoking status: Never    Smokeless tobacco: Never   Substance Use Topics    Alcohol use: Not Currently    Drug use: Never          Review of Systems   Constitutional:  Positive for activity change, appetite change and fatigue.   Cardiovascular:  Positive for leg swelling.   Musculoskeletal:  Positive for back pain and joint swelling.   Skin:  Positive for color change.   Hematological:  Bruises/bleeds easily.   All other systems reviewed and are negative.    Physical:     Vital Signs (Most Recent)  Temp: 97.8 °F (36.6 °C) (10/06/22 1015)  Pulse: (!) 53 (10/06/22 1015)  Resp: 16 (10/06/22 1015)  BP: (!) 174/98 (10/06/22 1015)  5' 11" (1.803 m)  (!) 181.8 kg (400 lb 14.5 oz)     Body comp:  Fat Percent:  43.4 %  Fat Mass:  171.5 lb  FFM:  223.5 lb  TBW: 163.5 lb  BMR: 3001 kcal      Physical Exam  Constitutional:       General: He is not in acute distress.     Appearance: He is well-developed. He is not diaphoretic.   HENT:      Head: Atraumatic.      Mouth/Throat:      Pharynx: No oropharyngeal exudate.   Eyes:      General: No scleral icterus.        Right eye: No discharge.         Left eye: No discharge.      Pupils: Pupils are equal, round, and reactive to light.   Neck:      Thyroid: No thyromegaly.      Vascular: No JVD.      Trachea: No tracheal deviation.   Cardiovascular:      Rate and Rhythm: Normal rate and " regular rhythm.      Heart sounds: No murmur heard.    No friction rub. No gallop.   Pulmonary:      Effort: No respiratory distress.      Breath sounds: Normal breath sounds. No wheezing or rales.   Chest:      Chest wall: No tenderness.   Abdominal:      General: Bowel sounds are normal. There is no distension.      Palpations: Abdomen is soft. There is no mass.      Tenderness: There is no abdominal tenderness. There is no guarding or rebound.   Musculoskeletal:         General: Normal range of motion.      Cervical back: Normal range of motion.      Right lower leg: Edema present.      Left lower leg: Edema present.   Skin:     Findings: No erythema or rash.   Neurological:      Mental Status: He is alert and oriented to person, place, and time.       ASSESSMENT/PLAN:        1. Morbid obesity  EKG 12-lead    Ambulatory referral/consult to Psychiatry    [place] Upper GI    Ambulatory referral/consult to Cardiology      2. Morbid obesity with BMI of 50.0-59.9, adult        3. Hypertension, unspecified type        4. Hyperlipidemia, unspecified hyperlipidemia type        5. Type 2 diabetes mellitus without complication, without long-term current use of insulin  Ambulatory referral/consult to Nutrition Services      6. Stage 3 chronic kidney disease, unspecified whether stage 3a or 3b CKD        7. Primary osteoarthritis of both knees        8. AGUILA (obstructive sleep apnea)            Plan:  Bridgette Barnes has morbid obesity as their Body mass index is 55.92 kg/m². He would benefit from weight loss surgery and has chosen gastric sleeve surgery as the preferred procedure. He understands that this is a tool and lifestyle change will be necessary to keep weight off. I went over possible complications of all surgeries with the patient and he is agreeable to surgery.    He will need:    Labs  EKG  UGI   dietary consult  psych consult   Seminar    I will obtain the following clearances prior to surgery: cardiology    He has  multiple worsening medical problems which include: Htn, hld, santi, dm, obesity.  We are planning to treat this with diet, exercise, and possibly elective major surgery.  He has risk factors for elective major surgery which include: htn, hld, dm, santi    Diet plan: high protein low carb- mainly meats and vegetables  Exercise plan: Cardiovascular exercise, get HR over 100 for 20 minutes 3 times per week.  Start multivitamin

## 2022-10-07 ENCOUNTER — OFFICE VISIT (OUTPATIENT)
Dept: PAIN MEDICINE | Facility: CLINIC | Age: 67
End: 2022-10-07
Payer: MEDICARE

## 2022-10-07 VITALS — WEIGHT: 315 LBS | HEIGHT: 71 IN | RESPIRATION RATE: 18 BRPM | BODY MASS INDEX: 44.1 KG/M2

## 2022-10-07 DIAGNOSIS — M25.562 CHRONIC PAIN OF LEFT KNEE: ICD-10-CM

## 2022-10-07 DIAGNOSIS — M25.562 CHRONIC PAIN OF LEFT KNEE: Primary | ICD-10-CM

## 2022-10-07 DIAGNOSIS — M17.12 PRIMARY OSTEOARTHRITIS OF LEFT KNEE: Primary | ICD-10-CM

## 2022-10-07 DIAGNOSIS — G89.29 CHRONIC PAIN OF LEFT KNEE: Primary | ICD-10-CM

## 2022-10-07 DIAGNOSIS — Z79.891 CHRONIC USE OF OPIATE FOR THERAPEUTIC PURPOSE: ICD-10-CM

## 2022-10-07 DIAGNOSIS — M17.12 PRIMARY OSTEOARTHRITIS OF LEFT KNEE: ICD-10-CM

## 2022-10-07 DIAGNOSIS — G89.29 CHRONIC PAIN OF LEFT KNEE: ICD-10-CM

## 2022-10-07 PROCEDURE — 3008F BODY MASS INDEX DOCD: CPT | Mod: CPTII,S$GLB,,

## 2022-10-07 PROCEDURE — 1101F PR PT FALLS ASSESS DOC 0-1 FALLS W/OUT INJ PAST YR: ICD-10-PCS | Mod: CPTII,S$GLB,,

## 2022-10-07 PROCEDURE — 99999 PR PBB SHADOW E&M-EST. PATIENT-LVL III: CPT | Mod: PBBFAC,,,

## 2022-10-07 PROCEDURE — 99214 PR OFFICE/OUTPT VISIT, EST, LEVL IV, 30-39 MIN: ICD-10-PCS | Mod: S$GLB,,,

## 2022-10-07 PROCEDURE — 99999 PR PBB SHADOW E&M-EST. PATIENT-LVL III: ICD-10-PCS | Mod: PBBFAC,,,

## 2022-10-07 PROCEDURE — 1160F RVW MEDS BY RX/DR IN RCRD: CPT | Mod: CPTII,S$GLB,,

## 2022-10-07 PROCEDURE — 3288F PR FALLS RISK ASSESSMENT DOCUMENTED: ICD-10-PCS | Mod: CPTII,S$GLB,,

## 2022-10-07 PROCEDURE — 4010F ACE/ARB THERAPY RXD/TAKEN: CPT | Mod: CPTII,S$GLB,,

## 2022-10-07 PROCEDURE — 1101F PT FALLS ASSESS-DOCD LE1/YR: CPT | Mod: CPTII,S$GLB,,

## 2022-10-07 PROCEDURE — 4010F PR ACE/ARB THEARPY RXD/TAKEN: ICD-10-PCS | Mod: CPTII,S$GLB,,

## 2022-10-07 PROCEDURE — 1159F PR MEDICATION LIST DOCUMENTED IN MEDICAL RECORD: ICD-10-PCS | Mod: CPTII,S$GLB,,

## 2022-10-07 PROCEDURE — 1160F PR REVIEW ALL MEDS BY PRESCRIBER/CLIN PHARMACIST DOCUMENTED: ICD-10-PCS | Mod: CPTII,S$GLB,,

## 2022-10-07 PROCEDURE — 1159F MED LIST DOCD IN RCRD: CPT | Mod: CPTII,S$GLB,,

## 2022-10-07 PROCEDURE — 3288F FALL RISK ASSESSMENT DOCD: CPT | Mod: CPTII,S$GLB,,

## 2022-10-07 PROCEDURE — 3008F PR BODY MASS INDEX (BMI) DOCUMENTED: ICD-10-PCS | Mod: CPTII,S$GLB,,

## 2022-10-07 PROCEDURE — 80326 AMPHETAMINES 5 OR MORE: CPT

## 2022-10-07 PROCEDURE — 99214 OFFICE O/P EST MOD 30 MIN: CPT | Mod: S$GLB,,,

## 2022-10-07 RX ORDER — OXYCODONE AND ACETAMINOPHEN 10; 325 MG/1; MG/1
1 TABLET ORAL EVERY 12 HOURS PRN
Qty: 60 TABLET | Refills: 0 | Status: SHIPPED | OUTPATIENT
Start: 2022-10-07 | End: 2022-11-03 | Stop reason: SDUPTHER

## 2022-10-07 NOTE — PROGRESS NOTES
Referring Physician: No ref. provider found    PCP: Rosalinda Stockton NP    CC: left knee pain    HPI:   Bridgette Barnes is a 67 y.o. male with PMH significant for morbid obesity, HTN, pre-diabetes, and atrial fibrillation on Eliquis presents for the evaluation of left knee pain. The patient reports that his pain began approximately since high school s/p football injury. The patient denies of prior left knee surgery. The patient reports of worsening severity since onset. The patient localizes his pain to the anterior aspect of his left knee. The patient reports of radiation down his left shin. The patient describes his pain as a sharp type of pain. The patient denies of numbness. The patient reports that his pain is a 8-10/10. Patient denies of any urinary/fecal incontinence, saddle anesthesia, or weakness.     Aggravating factors: walking, standing    Mitigating factors: pain somewhat improved after he gets moving; minimal relief with Norco  mg     Relevant Surgeries: no; Dr. Romero at [facility]; was told he had to lose weight prior to arthroplasty    INTERVAL HPI: Bridgette Barnes is a 67 y.o. male with PMH significant for morbid obesity, HTN, pre-diabetes, and atrial fibrillation on Eliquis presents as an established patient, new to me, for the management of left knee pain. Today, the patient continues to localize his worse pain to his left knee. The patient localizes his pain to the anterior aspect of his left knee. The patient reports of radiation down his left shin. The patient describes his pain as a sharp type of pain. The patient denies of numbness. The patient reports that his pain is a 7/10.  The patient reports benefit with Percocet 10 but does report of headaches with medicaiton. In the interim, the patient was evaluated by Dr. Burgos for gastric sleeve. The patient reports he is beginning the 3 month plan to assess if he can proceed with gastric sleeve. The patient reports he plans  "to under go knee replacement s/p gastric sleeve. The patient also reports he is considering left knee coolief in the meantime. The patient denies of any significant changes in his health since his last appointment. The patient also denies of any changes in the character of his pain since his last appointment.   Patient denies of any urinary/fecal incontinence, saddle anesthesia, or weakness.       Interventional Therapies: yes; steroid injections with minimal, short lived relief. Tried viscosupplementation without benefit.     : Reviewed and consistent with medication use as prescribed.               Pain Medications:         Currently taking: Percocet 10-325mg po q12 hrs.     Anticoagulation: yes; Eliquis    ROS:  Review of Systems   Constitutional:  Negative for chills and fever.   HENT:  Negative for tinnitus.    Eyes:  Negative for visual disturbance.   Respiratory:  Negative for shortness of breath.    Cardiovascular:  Negative for chest pain.   Gastrointestinal:  Negative for nausea and vomiting.   Genitourinary:  Negative for difficulty urinating.   Musculoskeletal:  Positive for arthralgias.   Skin:  Negative for rash.   Allergic/Immunologic: Negative for immunocompromised state.   Neurological:  Negative for syncope and numbness.   Hematological:  Does not bruise/bleed easily.   Psychiatric/Behavioral:  Negative for suicidal ideas.    All other systems reviewed and are negative.     MEDICAL, SURGICAL, FAMILY, SOCIAL HX: reviewed    MEDICATIONS/ALLERGIES: reviewed         Allergies: See med card    Vitals:    10/07/22 0858   Resp: 18   Weight: (!) 181.4 kg (400 lb)   Height: 5' 11" (1.803 m)   PainSc:   7         Physical exam:  Physical Exam  Vitals and nursing note reviewed.   Constitutional:       Appearance: Normal appearance. He is not toxic-appearing or diaphoretic.   HENT:      Head: Normocephalic and atraumatic.   Eyes:      General:         Right eye: No discharge.         Left eye: No discharge. "      Extraocular Movements: Extraocular movements intact.      Conjunctiva/sclera: Conjunctivae normal.   Cardiovascular:      Rate and Rhythm: Normal rate.   Pulmonary:      Effort: Pulmonary effort is normal. No respiratory distress.      Breath sounds: Normal breath sounds.   Abdominal:      Palpations: Abdomen is soft.   Skin:     General: Skin is warm and dry.      Findings: No rash.   Neurological:      Mental Status: He is alert and oriented to person, place, and time.   Psychiatric:         Mood and Affect: Mood and affect normal.         Cognition and Memory: Memory normal.         Judgment: Judgment normal.        UPPER EXTREMITIES: Normal alignment, normal range of motion, no atrophy, no skin changes,  hair growth and nail growth normal and equal bilaterally. No swelling, no tenderness.    LOWER EXTREMITIES:  Normal alignment, normal range of motion, no atrophy, no skin changes,  hair growth and nail growth normal and equal bilaterally. No swelling, no tenderness.    Knee exam:    Extension/flexion equal bilaterally.   Positive crepitus with in the left knee  No effusion both knees.    Positive joint line tenderness     CRANIAL NERVES:  II:  PERRL bilaterally,   III,IV,VI: EOMI.    V:  Facial sensation equal bilaterally  VII:  Facial motor function normal.  VIII:  Hearing equal to finger rub bilaterally  IX/X: Gag normal, palate symmetric  XI:  Shoulder shrug equal, head turn equal  XII:  Tongue midline without fasciculations      MOTOR: Tone and bulk: normal     MUSCLE STRENGTH:  Hip Flexion: Right 5/5, Left 5/5  Hip Extension: Right 5/5, Left 5/5  Leg Flexion: Right 5/5, Left 5/5  Leg Extension: Right 5/5, Left 5/5  Plantar Flexion: Right 5/5, Left 5/5  Dorsiflexion: Right 5/5, Left 5/5    Delt Bi Tri     Right: 5/5 5/5 5/5 5/5   Left: 5/5 5/5 5/5 5/5     SENSATION: Light touch and pinprick intact bilaterally  REFLEXES: normal, symmetric, nonbrisk.  Toes down, no clonus. Negative corrales's sign  "bilaterally.  GAIT: antalgic gait; uses a single point cane for assistance with ambulation       Imaging: per last PCP visit "Last x-ray was in 4/2022 and revealed: Again noted is severe narrowing of the medial joint space with medial lateral marginal osteophytes. Prominent patellofemoral joint space narrowing and osteophytes. No acute fracture or dislocation. No joint effusion. Soft tissues normal."    Assessment: Bridgette Barnes is a 67 y.o. male with PMH significant for morbid obesity, HTN, pre-diabetes, and atrial fibrillation on Eliquis presents as an established patient, new to me, for the management of left knee pain. Patient with severe OA of the left knee. Patient reports that he is unable to get a knee arthroplasty given his morbid obesity. Patient continues to localize worse pain to his left knee. The patient reports benefit with Percocet.  The patient presents today for medication refill. The patient was evaluated by Dr. Burgos for gastric sleeve and elected to proceed.  Treatment plan outlined below.     Plan:    - Refilled Percocet  mg PO q 12 hr PRN for breakthrough pain; # 60 tablets; 0 refills.  reviewed without discrepancies.   -Recommended use of Voltaren gel OTC to assist in pain relief of left knee.   - I have stressed the importance of physical activity and a home exercise plan to help with chronic pain and improve health.  - UDS collected today, last had pain medication this morning.   - Patient considering coolief of left knee, can contact office to call and schedule Genicular nerve block of left knee and proceed to coolief if successful when ready.   - Discussed Dr. Galindo's previous med management plan with patient.  (Explained to the patient that I do not intend to manage his left knee pathology with chronic opioids for the rest of his life as that is not appropriate. I outlined that the patient must make substantial progress in regards to weight loss with progression to left knee " arthroplasty within 6 months to 1 year. After which, I will no longer provide the patient with opioids. Patient in agreement.)  - RTC in 4 weeks for follow-up, medication refill, and UDS  All medication management performed by Dr. Galindo.    Kriss Tan, NP

## 2022-10-12 ENCOUNTER — TELEPHONE (OUTPATIENT)
Dept: BARIATRICS | Facility: CLINIC | Age: 67
End: 2022-10-12
Payer: MEDICARE

## 2022-10-12 NOTE — TELEPHONE ENCOUNTER
----- Message from Hermelinda Eddy sent at 10/12/2022 11:59 AM CDT -----  THIS PT WANTS HIS [place] ORDERS SENT TO [facility] IN [place] TO HAVE DONE THERE. CAN SOMEONE SEND THOSE FOR HIM?   THANKS

## 2022-10-15 LAB
6MAM UR QL: NOT DETECTED
7AMINOCLONAZEPAM UR QL: NOT DETECTED
A-OH ALPRAZ UR QL: NOT DETECTED
ALPHA-OH-MIDAZOLAM: NOT DETECTED
ALPRAZ UR QL: NOT DETECTED
AMPHET UR QL SCN: NOT DETECTED
ANNOTATION COMMENT IMP: NORMAL
ANNOTATION COMMENT IMP: NORMAL
BARBITURATES UR QL: NOT DETECTED
BUPRENORPHINE UR QL: NOT DETECTED
BZE UR QL: NOT DETECTED
CARBOXYTHC UR QL: NOT DETECTED
CARISOPRODOL UR QL: NOT DETECTED
CLONAZEPAM UR QL: NOT DETECTED
CODEINE UR QL: NOT DETECTED
CREAT UR-MCNC: 104 MG/DL (ref 20–400)
DIAZEPAM UR QL: NOT DETECTED
ETHYL GLUCURONIDE UR QL: NOT DETECTED
FENTANYL UR QL: NOT DETECTED
GABAPENTIN: NOT DETECTED
HYDROCODONE UR QL: NOT DETECTED
HYDROMORPHONE UR QL: NOT DETECTED
LORAZEPAM UR QL: NOT DETECTED
MDA UR QL: NOT DETECTED
MDEA UR QL: NOT DETECTED
MDMA UR QL: NOT DETECTED
ME-PHENIDATE UR QL: NOT DETECTED
METHADONE UR QL: NOT DETECTED
METHAMPHET UR QL: NOT DETECTED
MIDAZOLAM UR QL SCN: NOT DETECTED
MORPHINE UR QL: NOT DETECTED
NALOXONE: NOT DETECTED
NORBUPRENORPHINE UR QL CFM: NOT DETECTED
NORDIAZEPAM UR QL: NOT DETECTED
NORFENTANYL UR QL: NOT DETECTED
NORHYDROCODONE UR QL CFM: NOT DETECTED
NORMEPERIDINE UR QL CFM: NOT DETECTED
NOROXYCODONE UR QL CFM: PRESENT
NOROXYMORPHONE UR QL SCN: PRESENT
OXAZEPAM UR QL: NOT DETECTED
OXYCODONE UR QL: PRESENT
OXYMORPHONE UR QL: PRESENT
PATHOLOGY STUDY: NORMAL
PCP UR QL: NOT DETECTED
PHENTERMINE UR QL: NOT DETECTED
PREGABALIN: NOT DETECTED
SERVICE CMNT-IMP: NORMAL
TAPENTADOL UR QL SCN: NOT DETECTED
TAPENTADOL UR QL SCN: NOT DETECTED
TEMAZEPAM UR QL: NOT DETECTED
TRAMADOL UR QL: NOT DETECTED
ZOLPIDEM METABOLITE: NOT DETECTED
ZOLPIDEM UR QL: NOT DETECTED

## 2022-10-25 ENCOUNTER — CLINICAL SUPPORT (OUTPATIENT)
Dept: BARIATRICS | Facility: CLINIC | Age: 67
End: 2022-10-25
Payer: MEDICARE

## 2022-10-25 VITALS — BODY MASS INDEX: 44.1 KG/M2 | WEIGHT: 315 LBS | HEIGHT: 71 IN

## 2022-10-25 DIAGNOSIS — E66.01 MORBID OBESITY WITH BODY MASS INDEX (BMI) GREATER THAN OR EQUAL TO 50: ICD-10-CM

## 2022-10-25 DIAGNOSIS — Z71.3 DIETARY COUNSELING AND SURVEILLANCE: Primary | ICD-10-CM

## 2022-10-25 DIAGNOSIS — E11.9 TYPE 2 DIABETES MELLITUS WITHOUT COMPLICATION, WITHOUT LONG-TERM CURRENT USE OF INSULIN: ICD-10-CM

## 2022-10-25 PROCEDURE — 97802 PR MED NUTR THER, 1ST, INDIV, EA 15 MIN: ICD-10-PCS | Mod: S$GLB,,, | Performed by: DIETITIAN, REGISTERED

## 2022-10-25 PROCEDURE — 97802 MEDICAL NUTRITION INDIV IN: CPT | Mod: S$GLB,,, | Performed by: DIETITIAN, REGISTERED

## 2022-10-25 PROCEDURE — 99999 PR PBB SHADOW E&M-EST. PATIENT-LVL III: ICD-10-PCS | Mod: PBBFAC,,, | Performed by: DIETITIAN, REGISTERED

## 2022-10-25 PROCEDURE — 99999 PR PBB SHADOW E&M-EST. PATIENT-LVL III: CPT | Mod: PBBFAC,,, | Performed by: DIETITIAN, REGISTERED

## 2022-10-25 NOTE — PROGRESS NOTES
NUTRITIONAL CONSULT    Referring Physician: Dr. Burgos  Reason for MNT Referral: Initial assessment for sleeve gastrectomy work-up    PAST MEDICAL HISTORY:   67 y.o. male presents with a BMI of Body mass index is 55.02 kg/m²..  Past attempts at weight loss include: Unsupervised: na;  Supervised:  optivia, ;  Diet pills: fastin;  Exercise attempts: stationary cycle, swimming      Weight history:   At current weight:  5 years  Obese for 17 years.  More than 35 pounds overweight for 25 years.  More than 100 pounds overweight for 15 years.  Started dieting at 65 years old.  Maximum weight reached: 400 pounds  Most weight lost was 77 pounds through optavia for 5 years.  He describes His eating habits as volume eater, sweet eater, snacker/grazer, binge eater, emotional eater.    Past Medical History:   Diagnosis Date    Atrial fibrillation     CKD (chronic kidney disease), stage III     Diabetes mellitus     Gout, unspecified     Hyperlipidemia     Hypertension     Sleep apnea        CLINICAL DATA:  67 y.o.-year-old White male.  Height: 5'11  Weight: 394 lbs  IBW: 166 lbs  BMI: 55.02  The patient's goal weight (50 % EBW): 280 lbs    Goal for Bariatric Surgery: to lose weight and to be around for my grandchildren.    NUTRITION & HEALTH HISTORY:  Greatest challenge: irregular meal patterns, portions, snacker/grazer    Current diet recall: Breakfast:  2-3 times a week he eats breakfast of omelet or scrambled egg.  Lunch: will eat 3-4 times a week.  He will eat breakfast or lunch.  Will eat the same as he would for breakfast.  Dinner: beans no rice, protein + vegetable, protein only    Current Diet:  Meal pattern: irregular.  Protein supplements: 0.   Snacking: snacks / day  Vegetables: Likes a variety. Eats 2-3 times per week.  Fruits: Likes a variety. Eats once per week.  Beverages: water and sweet tea  Dining out: Weekly. Mostly fast food, restaurants, and take-out.  Cooking at home: Daily. Mostly baked  and grilled meat, fish, starchy CHO, and vegetables.    Exercise:  Past exercise: None    Current exercise: None  Restrictions to exercise: none    Vitamins / Minerals / Herbs:   Patient reports he takes 9 supplements, but was unable to list them all.  Patient was asked to bring list to next appointment to be documented.  MVI, vitamin D, apple cider vinegar, magnesium    Food Allergies:   NKFA    Social:  Retired.  Lives alone.  Grocery shopping and food prep self.  Alcohol: None.  Smoking: None.    ASSESSMENT:  Patient reports attempts at weight loss, only to regain lost weight.  Patient demonstrated knowledge of healthy eating behaviors and exercise patterns; admits to not eating healthy and not exercising at this point.  Patient states willingness to change lifestyle and make behavior modifications.  Expect poor-fair compliance after surgery at this time because he feels it is a challenge to prepare food for one person.  Skipping multiple meals.  Filled out in pre-assessment his issue is snacking and grazing, but did not report this to RD.  Patient taking Glipizide without food or a protein only meal.  He reports he experiences no hypoglycemia, but not checking blood sugar consistently.  Concern for future hypoglycemic episodes.    Insurance requires medically supervised diet prior to consideration for bariatric surgery.    BARIATRIC DIET DISCUSSION:  Discussed diet after surgery and related to patients food record.  Reviewed diet progression before and after surgery.  Stressed importance of exercise and its role in achieving weight loss goals.  Answered all questions.  Reinforced surgery will only help with portion sizes.  It will not help make healthy food choices.  Patient must make choices to reach protein, fluids, and other intake goals as well as take vitamins.  Discussed the rule of 15/15 to treat hypoglycemic episodes.  Pair meals with a protein source and a healthy carbohydrate source like fruit, whole  grain, or beans.    RECOMMENDATIONS:  Patient is a potential candidate for bariatric surgery only if he can make and maintain the intended lifestyle changes permanently.  Patient has made no changes as of this time so unable to fully assess his motivation to make these changes.    Needs additional visit(s) with RD.    PLAN:  Resume work-up for surgery.  Continue to review Bariatric Nutrition Guidebook at home and call with any questions.  Work on Bariatric Nutrition Checklist.  Work on expanding variety of vegetables.  Begin trying various protein supplements to determine preference.  Increase exercise.    SESSION TIME:  60 minutes

## 2022-11-03 ENCOUNTER — OFFICE VISIT (OUTPATIENT)
Dept: PAIN MEDICINE | Facility: CLINIC | Age: 67
End: 2022-11-03
Payer: MEDICARE

## 2022-11-03 VITALS — BODY MASS INDEX: 44.1 KG/M2 | HEIGHT: 71 IN | RESPIRATION RATE: 18 BRPM | WEIGHT: 315 LBS

## 2022-11-03 DIAGNOSIS — M17.12 PRIMARY OSTEOARTHRITIS OF LEFT KNEE: ICD-10-CM

## 2022-11-03 DIAGNOSIS — G89.29 CHRONIC PAIN OF LEFT KNEE: ICD-10-CM

## 2022-11-03 DIAGNOSIS — M25.562 CHRONIC PAIN OF LEFT KNEE: ICD-10-CM

## 2022-11-03 DIAGNOSIS — M25.562 CHRONIC PAIN OF LEFT KNEE: Primary | ICD-10-CM

## 2022-11-03 DIAGNOSIS — M17.12 PRIMARY OSTEOARTHRITIS OF LEFT KNEE: Primary | ICD-10-CM

## 2022-11-03 DIAGNOSIS — Z79.891 CHRONIC USE OF OPIATE FOR THERAPEUTIC PURPOSE: ICD-10-CM

## 2022-11-03 DIAGNOSIS — G89.29 CHRONIC PAIN OF LEFT KNEE: Primary | ICD-10-CM

## 2022-11-03 PROCEDURE — 3288F PR FALLS RISK ASSESSMENT DOCUMENTED: ICD-10-PCS | Mod: CPTII,S$GLB,,

## 2022-11-03 PROCEDURE — 99214 OFFICE O/P EST MOD 30 MIN: CPT | Mod: S$GLB,,,

## 2022-11-03 PROCEDURE — 3288F FALL RISK ASSESSMENT DOCD: CPT | Mod: CPTII,S$GLB,,

## 2022-11-03 PROCEDURE — 3008F PR BODY MASS INDEX (BMI) DOCUMENTED: ICD-10-PCS | Mod: CPTII,S$GLB,,

## 2022-11-03 PROCEDURE — 1101F PT FALLS ASSESS-DOCD LE1/YR: CPT | Mod: CPTII,S$GLB,,

## 2022-11-03 PROCEDURE — 80326 AMPHETAMINES 5 OR MORE: CPT

## 2022-11-03 PROCEDURE — 1160F RVW MEDS BY RX/DR IN RCRD: CPT | Mod: CPTII,S$GLB,,

## 2022-11-03 PROCEDURE — 4010F PR ACE/ARB THEARPY RXD/TAKEN: ICD-10-PCS | Mod: CPTII,S$GLB,,

## 2022-11-03 PROCEDURE — 4010F ACE/ARB THERAPY RXD/TAKEN: CPT | Mod: CPTII,S$GLB,,

## 2022-11-03 PROCEDURE — 1101F PR PT FALLS ASSESS DOC 0-1 FALLS W/OUT INJ PAST YR: ICD-10-PCS | Mod: CPTII,S$GLB,,

## 2022-11-03 PROCEDURE — 1159F MED LIST DOCD IN RCRD: CPT | Mod: CPTII,S$GLB,,

## 2022-11-03 PROCEDURE — 1160F PR REVIEW ALL MEDS BY PRESCRIBER/CLIN PHARMACIST DOCUMENTED: ICD-10-PCS | Mod: CPTII,S$GLB,,

## 2022-11-03 PROCEDURE — 99214 PR OFFICE/OUTPT VISIT, EST, LEVL IV, 30-39 MIN: ICD-10-PCS | Mod: S$GLB,,,

## 2022-11-03 PROCEDURE — 80355 GABAPENTIN NON-BLOOD: CPT

## 2022-11-03 PROCEDURE — 1159F PR MEDICATION LIST DOCUMENTED IN MEDICAL RECORD: ICD-10-PCS | Mod: CPTII,S$GLB,,

## 2022-11-03 PROCEDURE — 3008F BODY MASS INDEX DOCD: CPT | Mod: CPTII,S$GLB,,

## 2022-11-03 PROCEDURE — 99999 PR PBB SHADOW E&M-EST. PATIENT-LVL III: CPT | Mod: PBBFAC,,,

## 2022-11-03 PROCEDURE — 99999 PR PBB SHADOW E&M-EST. PATIENT-LVL III: ICD-10-PCS | Mod: PBBFAC,,,

## 2022-11-03 RX ORDER — OXYCODONE AND ACETAMINOPHEN 10; 325 MG/1; MG/1
1 TABLET ORAL EVERY 12 HOURS PRN
Qty: 60 TABLET | Refills: 0 | Status: SHIPPED | OUTPATIENT
Start: 2022-11-06 | End: 2022-12-05 | Stop reason: SDUPTHER

## 2022-11-03 NOTE — PROGRESS NOTES
Referring Physician: No ref. provider found    PCP: Rosalinda Stockton NP    CC: left knee pain    HPI:   Bridgette Barnes is a 67 y.o. male with PMH significant for morbid obesity, HTN, pre-diabetes, and atrial fibrillation on Eliquis presents for the evaluation of left knee pain. The patient reports that his pain began approximately since high school s/p football injury. The patient denies of prior left knee surgery. The patient reports of worsening severity since onset. The patient localizes his pain to the anterior aspect of his left knee. The patient reports of radiation down his left shin. The patient describes his pain as a sharp type of pain. The patient denies of numbness. The patient reports that his pain is a 8-10/10. Patient denies of any urinary/fecal incontinence, saddle anesthesia, or weakness.     Aggravating factors: walking, standing    Mitigating factors: pain somewhat improved after he gets moving; minimal relief with Norco  mg     Relevant Surgeries: no; Dr. Romero at [facility]; was told he had to lose weight prior to arthroplasty    INTERVAL HPI: Bridgette Barnes is a 67 y.o. male with PMH significant for morbid obesity, HTN, pre-diabetes, and atrial fibrillation on Eliquis presents as an established patient for the management of left knee pain. The patient presents today for medication refill. Today, the patient continues to localize his worse pain to his left knee. The patient localizes his pain to the anterior aspect of his left knee. The patient reports of radiation down his left shin. The patient describes his pain as a sharp type of pain. The patient denies of numbness. The patient reports that his pain is a 7/10.  The patient reports benefit with Percocet 10 but does report of headaches with medicaiton. The patient reports he is still under Dr. Burgos's care for bariatric surgery in the future. The patient reports he plans to under go knee replacement s/p gastric sleeve.  "The patient also reports he is considering left knee coolief in the meantime. The patient denies of any significant changes in his health since his last appointment. The patient also denies of any changes in the character of his pain since his last appointment.   Patient denies of any urinary/fecal incontinence, saddle anesthesia, or weakness.       Interventional Therapies: yes; steroid injections with minimal, short lived relief. Tried viscosupplementation without benefit.     : Reviewed and consistent with medication use as prescribed.               Pain Medications:         Currently taking: Percocet 10-325mg po q12 hrs.     Anticoagulation: yes; Eliquis    ROS:  Review of Systems   Constitutional:  Negative for chills and fever.   HENT:  Negative for tinnitus.    Eyes:  Negative for visual disturbance.   Respiratory:  Negative for shortness of breath.    Cardiovascular:  Negative for chest pain.   Gastrointestinal:  Negative for nausea and vomiting.   Genitourinary:  Negative for difficulty urinating.   Musculoskeletal:  Positive for arthralgias and gait problem.   Skin:  Negative for rash.   Allergic/Immunologic: Negative for immunocompromised state.   Neurological:  Negative for syncope and numbness.   Hematological:  Does not bruise/bleed easily.   Psychiatric/Behavioral:  Negative for suicidal ideas.    All other systems reviewed and are negative.     MEDICAL, SURGICAL, FAMILY, SOCIAL HX: reviewed    MEDICATIONS/ALLERGIES: reviewed         Allergies: See med card    Vitals:    11/03/22 1141   Resp: 18   Weight: (!) 177.4 kg (391 lb)   Height: 5' 11" (1.803 m)   PainSc:   7         Physical exam:  Physical Exam  Vitals and nursing note reviewed.   Constitutional:       Appearance: Normal appearance. He is not toxic-appearing or diaphoretic.   HENT:      Head: Normocephalic and atraumatic.   Eyes:      General:         Right eye: No discharge.         Left eye: No discharge.      Extraocular Movements: " Extraocular movements intact.      Conjunctiva/sclera: Conjunctivae normal.   Cardiovascular:      Rate and Rhythm: Normal rate.   Pulmonary:      Effort: Pulmonary effort is normal. No respiratory distress.      Breath sounds: Normal breath sounds.   Abdominal:      Palpations: Abdomen is soft.   Skin:     General: Skin is warm and dry.      Findings: No rash.   Neurological:      Mental Status: He is alert and oriented to person, place, and time.   Psychiatric:         Mood and Affect: Mood and affect normal.         Cognition and Memory: Memory normal.         Judgment: Judgment normal.        UPPER EXTREMITIES: Normal alignment, normal range of motion, no atrophy, no skin changes,  hair growth and nail growth normal and equal bilaterally. No swelling, no tenderness.    LOWER EXTREMITIES:  Normal alignment, normal range of motion, no atrophy, no skin changes,  hair growth and nail growth normal and equal bilaterally. No swelling, no tenderness.    Knee exam:    Extension/flexion equal bilaterally.   Positive crepitus with in the left knee  No effusion both knees.    Positive joint line tenderness     CRANIAL NERVES:  II:  PERRL bilaterally,   III,IV,VI: EOMI.    V:  Facial sensation equal bilaterally  VII:  Facial motor function normal.  VIII:  Hearing equal to finger rub bilaterally  IX/X: Gag normal, palate symmetric  XI:  Shoulder shrug equal, head turn equal  XII:  Tongue midline without fasciculations      MOTOR: Tone and bulk: normal     MUSCLE STRENGTH:  Hip Flexion: Right 5/5, Left 5/5  Hip Extension: Right 5/5, Left 5/5  Leg Flexion: Right 5/5, Left 5/5  Leg Extension: Right 5/5, Left 5/5  Plantar Flexion: Right 5/5, Left 5/5  Dorsiflexion: Right 5/5, Left 5/5    Delt Bi Tri     Right: 5/5 5/5 5/5 5/5   Left: 5/5 5/5 5/5 5/5     SENSATION: Light touch and pinprick intact bilaterally  REFLEXES: normal, symmetric, nonbrisk.  Toes down, no clonus. Negative corrales's sign bilaterally.  GAIT: antalgic  "gait; uses a single point cane for assistance with ambulation       Imaging: per last PCP visit "Last x-ray was in 4/2022 and revealed: Again noted is severe narrowing of the medial joint space with medial lateral marginal osteophytes. Prominent patellofemoral joint space narrowing and osteophytes. No acute fracture or dislocation. No joint effusion. Soft tissues normal."    Assessment: Bridgette Barnes is a 67 y.o. male with PMH significant for morbid obesity, HTN, pre-diabetes, and atrial fibrillation on Eliquis presents as an established patient for the management of left knee pain. The patient presents today for medication refill.  Patient with severe OA of the left knee. Patient reports that he is unable to get a knee arthroplasty given his morbid obesity. Patient continues to localize worse pain to his left knee. The patient reports benefit with Percocet.  The patient wishes to discuss left knee coolief procedure.  Treatment plan outlined below.     Plan:    - Refilled Percocet  mg PO q 12 hr PRN for breakthrough pain; # 60 tablets; 0 refills.  reviewed without discrepancies.   - UDS collected today, last had pain medication this morning.   - I have stressed the importance of physical activity and a home exercise plan to help with chronic pain and improve health.  - Discussed coolief of left knee, can contact office to call and schedule Genicular nerve block of left knee and proceed to coolief if successful. Pt wishes to defer at this time, he does not want to travel to [place] for the procedure.    - Discussed Dr. Galindo's previous med management plan with patient.  (Explained to the patient that I do not intend to manage his left knee pathology with chronic opioids for the rest of his life as that is not appropriate. I outlined that the patient must make substantial progress in regards to weight loss with progression to left knee arthroplasty within 6 months to 1 year. After which, I will no longer " provide the patient with opioids. Patient in agreement.)  - RTC in 4 weeks for follow-up, medication refill, and UDS  All medication management performed by Dr. Galindo.    Kriss Tan, NP

## 2022-11-08 LAB
6MAM UR QL: NOT DETECTED
7AMINOCLONAZEPAM UR QL: NOT DETECTED
A-OH ALPRAZ UR QL: NOT DETECTED
ALPHA-OH-MIDAZOLAM: NOT DETECTED
ALPRAZ UR QL: NOT DETECTED
AMPHET UR QL SCN: NOT DETECTED
ANNOTATION COMMENT IMP: NORMAL
ANNOTATION COMMENT IMP: NORMAL
BARBITURATES UR QL: NOT DETECTED
BUPRENORPHINE UR QL: NOT DETECTED
BZE UR QL: NOT DETECTED
CARBOXYTHC UR QL: NOT DETECTED
CARISOPRODOL UR QL: NOT DETECTED
CLONAZEPAM UR QL: NOT DETECTED
CODEINE UR QL: NOT DETECTED
CREAT UR-MCNC: 106.6 MG/DL (ref 20–400)
DIAZEPAM UR QL: NOT DETECTED
ETHYL GLUCURONIDE UR QL: NOT DETECTED
FENTANYL UR QL: NOT DETECTED
GABAPENTIN: NOT DETECTED
HYDROCODONE UR QL: NOT DETECTED
HYDROMORPHONE UR QL: NOT DETECTED
LORAZEPAM UR QL: NOT DETECTED
MDA UR QL: NOT DETECTED
MDEA UR QL: NOT DETECTED
MDMA UR QL: NOT DETECTED
ME-PHENIDATE UR QL: NOT DETECTED
METHADONE UR QL: NOT DETECTED
METHAMPHET UR QL: NOT DETECTED
MIDAZOLAM UR QL SCN: NOT DETECTED
MORPHINE UR QL: NOT DETECTED
NALOXONE: NOT DETECTED
NORBUPRENORPHINE UR QL CFM: NOT DETECTED
NORDIAZEPAM UR QL: NOT DETECTED
NORFENTANYL UR QL: NOT DETECTED
NORHYDROCODONE UR QL CFM: NOT DETECTED
NORMEPERIDINE UR QL CFM: NOT DETECTED
NOROXYCODONE UR QL CFM: PRESENT
NOROXYMORPHONE UR QL SCN: PRESENT
OXAZEPAM UR QL: NOT DETECTED
OXYCODONE UR QL: PRESENT
OXYMORPHONE UR QL: PRESENT
PATHOLOGY STUDY: NORMAL
PCP UR QL: NOT DETECTED
PHENTERMINE UR QL: NOT DETECTED
PREGABALIN: NOT DETECTED
SERVICE CMNT-IMP: NORMAL
TAPENTADOL UR QL SCN: NOT DETECTED
TAPENTADOL UR QL SCN: NOT DETECTED
TEMAZEPAM UR QL: NOT DETECTED
TRAMADOL UR QL: NOT DETECTED
ZOLPIDEM METABOLITE: NOT DETECTED
ZOLPIDEM UR QL: NOT DETECTED

## 2022-11-09 ENCOUNTER — TELEPHONE (OUTPATIENT)
Dept: BARIATRICS | Facility: CLINIC | Age: 67
End: 2022-11-09
Payer: MEDICARE

## 2022-11-09 NOTE — TELEPHONE ENCOUNTER
Returned call to pt. Scheduled appt per his request. Pt agreed to date/time. Location verified.     ----- Message from Mahi Mcgee sent at 11/9/2022 11:04 AM CST -----  Regarding: RESCHED MISSED APPT  Contact: Patient  Type: Patient Call Back         Who called: Patient         What is the request in detail: calling to resched appt missed on 11/3; please advise         Can the clinic reply by [facility]?yes         Would the patient rather a call back or a response via [facility]? both         Best call back number: [phone]         Additional Information: prefers any morning day           Thank You

## 2022-12-05 ENCOUNTER — OFFICE VISIT (OUTPATIENT)
Dept: PAIN MEDICINE | Facility: CLINIC | Age: 67
End: 2022-12-05
Payer: MEDICARE

## 2022-12-05 DIAGNOSIS — M17.12 PRIMARY OSTEOARTHRITIS OF LEFT KNEE: ICD-10-CM

## 2022-12-05 DIAGNOSIS — M25.562 CHRONIC PAIN OF LEFT KNEE: Primary | ICD-10-CM

## 2022-12-05 DIAGNOSIS — G89.29 CHRONIC PAIN OF LEFT KNEE: Primary | ICD-10-CM

## 2022-12-05 DIAGNOSIS — M17.12 PRIMARY OSTEOARTHRITIS OF LEFT KNEE: Primary | ICD-10-CM

## 2022-12-05 DIAGNOSIS — M25.562 CHRONIC PAIN OF LEFT KNEE: ICD-10-CM

## 2022-12-05 DIAGNOSIS — G89.29 CHRONIC PAIN OF LEFT KNEE: ICD-10-CM

## 2022-12-05 DIAGNOSIS — Z79.891 CHRONIC USE OF OPIATE FOR THERAPEUTIC PURPOSE: ICD-10-CM

## 2022-12-05 PROCEDURE — 99214 PR OFFICE/OUTPT VISIT, EST, LEVL IV, 30-39 MIN: ICD-10-PCS | Mod: S$GLB,,,

## 2022-12-05 PROCEDURE — 1101F PT FALLS ASSESS-DOCD LE1/YR: CPT | Mod: CPTII,S$GLB,,

## 2022-12-05 PROCEDURE — 1160F PR REVIEW ALL MEDS BY PRESCRIBER/CLIN PHARMACIST DOCUMENTED: ICD-10-PCS | Mod: CPTII,S$GLB,,

## 2022-12-05 PROCEDURE — 3288F FALL RISK ASSESSMENT DOCD: CPT | Mod: CPTII,S$GLB,,

## 2022-12-05 PROCEDURE — 1159F PR MEDICATION LIST DOCUMENTED IN MEDICAL RECORD: ICD-10-PCS | Mod: CPTII,S$GLB,,

## 2022-12-05 PROCEDURE — 1101F PR PT FALLS ASSESS DOC 0-1 FALLS W/OUT INJ PAST YR: ICD-10-PCS | Mod: CPTII,S$GLB,,

## 2022-12-05 PROCEDURE — 1160F RVW MEDS BY RX/DR IN RCRD: CPT | Mod: CPTII,S$GLB,,

## 2022-12-05 PROCEDURE — 3288F PR FALLS RISK ASSESSMENT DOCUMENTED: ICD-10-PCS | Mod: CPTII,S$GLB,,

## 2022-12-05 PROCEDURE — 4010F PR ACE/ARB THEARPY RXD/TAKEN: ICD-10-PCS | Mod: CPTII,S$GLB,,

## 2022-12-05 PROCEDURE — 4010F ACE/ARB THERAPY RXD/TAKEN: CPT | Mod: CPTII,S$GLB,,

## 2022-12-05 PROCEDURE — 99214 OFFICE O/P EST MOD 30 MIN: CPT | Mod: S$GLB,,,

## 2022-12-05 PROCEDURE — 1159F MED LIST DOCD IN RCRD: CPT | Mod: CPTII,S$GLB,,

## 2022-12-05 PROCEDURE — 99999 PR PBB SHADOW E&M-EST. PATIENT-LVL III: ICD-10-PCS | Mod: PBBFAC,,,

## 2022-12-05 PROCEDURE — 99999 PR PBB SHADOW E&M-EST. PATIENT-LVL III: CPT | Mod: PBBFAC,,,

## 2022-12-05 PROCEDURE — 80326 AMPHETAMINES 5 OR MORE: CPT

## 2022-12-05 RX ORDER — OXYCODONE AND ACETAMINOPHEN 10; 325 MG/1; MG/1
1 TABLET ORAL EVERY 12 HOURS PRN
Qty: 60 TABLET | Refills: 0 | Status: SHIPPED | OUTPATIENT
Start: 2023-01-03 | End: 2023-01-30 | Stop reason: SDUPTHER

## 2022-12-05 RX ORDER — OXYCODONE AND ACETAMINOPHEN 10; 325 MG/1; MG/1
1 TABLET ORAL EVERY 12 HOURS PRN
Qty: 60 TABLET | Refills: 0 | Status: SHIPPED | OUTPATIENT
Start: 2022-12-05 | End: 2023-01-30

## 2022-12-05 NOTE — PROGRESS NOTES
Referring Physician: No ref. provider found    PCP: Rosalinda Stockton NP    CC: left knee pain    HPI:   Bridgette Barnes is a 67 y.o. male with PMH significant for morbid obesity, HTN, pre-diabetes, and atrial fibrillation on Eliquis presents for the evaluation of left knee pain. The patient reports that his pain began approximately since high school s/p football injury. The patient denies of prior left knee surgery. The patient reports of worsening severity since onset. The patient localizes his pain to the anterior aspect of his left knee. The patient reports of radiation down his left shin. The patient describes his pain as a sharp type of pain. The patient denies of numbness. The patient reports that his pain is a 8-10/10. Patient denies of any urinary/fecal incontinence, saddle anesthesia, or weakness.     Aggravating factors: walking, standing    Mitigating factors: pain somewhat improved after he gets moving; minimal relief with Norco  mg     Relevant Surgeries: no; Dr. Romero at [facility]; was told he had to lose weight prior to arthroplasty    INTERVAL HPI: Bridgette Barnes is a 67 y.o. male with PMH significant for morbid obesity, HTN, pre-diabetes, and atrial fibrillation on Eliquis presents as an established patient for the management of left knee pain. The patient presents today for medication refill. Today, the patient continues to localize his worse pain to his left knee. The patient localizes his pain to the anterior aspect of his left knee. The patient reports of radiation down his left shin. The patient describes his pain as a sharp type of pain. The patient denies of numbness. The patient reports that his pain is a 8/10.  The patient reports benefit with Percocet 10 but does report of headaches with medicaiton. The patient reports he would like to discuss right knee ablation. The patient reports he is still under Dr. Burgos's care for bariatric surgery in the future. The patient  reports he plans to under go knee replacement s/p gastric sleeve. The patient denies of any significant changes in his health since his last appointment. The patient also denies of any changes in the character of his pain since his last appointment.   Patient denies of any urinary/fecal incontinence, saddle anesthesia, or weakness.       Interventional Therapies: yes; steroid injections with minimal, short lived relief. Tried viscosupplementation without benefit.     : Reviewed and consistent with medication use as prescribed.                 Pain Medications:         Currently taking: Percocet 10-325mg po q12 hrs.     Anticoagulation: yes; Eliquis    ROS:  Review of Systems   Constitutional:  Negative for chills and fever.   HENT:  Negative for tinnitus.    Eyes:  Negative for visual disturbance.   Respiratory:  Negative for shortness of breath.    Cardiovascular:  Negative for chest pain.   Gastrointestinal:  Negative for nausea and vomiting.   Genitourinary:  Negative for difficulty urinating.   Musculoskeletal:  Positive for arthralgias and gait problem.   Skin:  Negative for rash.   Allergic/Immunologic: Negative for immunocompromised state.   Neurological:  Negative for syncope and numbness.   Hematological:  Does not bruise/bleed easily.   Psychiatric/Behavioral:  Negative for suicidal ideas.    All other systems reviewed and are negative.     MEDICAL, SURGICAL, FAMILY, SOCIAL HX: reviewed    MEDICATIONS/ALLERGIES: reviewed         Allergies: See med card    There were no vitals filed for this visit.        Physical exam:  Physical Exam  Vitals and nursing note reviewed.   Constitutional:       Appearance: Normal appearance. He is not toxic-appearing or diaphoretic.   HENT:      Head: Normocephalic and atraumatic.   Eyes:      General:         Right eye: No discharge.         Left eye: No discharge.      Extraocular Movements: Extraocular movements intact.      Conjunctiva/sclera: Conjunctivae normal.    Cardiovascular:      Rate and Rhythm: Normal rate.   Pulmonary:      Effort: Pulmonary effort is normal. No respiratory distress.      Breath sounds: Normal breath sounds.   Abdominal:      Palpations: Abdomen is soft.   Skin:     General: Skin is warm and dry.      Findings: No rash.   Neurological:      Mental Status: He is alert and oriented to person, place, and time.   Psychiatric:         Mood and Affect: Mood and affect normal.         Cognition and Memory: Memory normal.         Judgment: Judgment normal.        UPPER EXTREMITIES: Normal alignment, normal range of motion, no atrophy, no skin changes,  hair growth and nail growth normal and equal bilaterally. No swelling, no tenderness.    LOWER EXTREMITIES:  Normal alignment, normal range of motion, no atrophy, no skin changes,  hair growth and nail growth normal and equal bilaterally. No swelling, no tenderness.    Knee exam:    Extension/flexion equal bilaterally.   Positive crepitus with in the left knee  No effusion both knees.    Positive joint line tenderness     CRANIAL NERVES:  II:  PERRL bilaterally,   III,IV,VI: EOMI.    V:  Facial sensation equal bilaterally  VII:  Facial motor function normal.  VIII:  Hearing equal to finger rub bilaterally  IX/X: Gag normal, palate symmetric  XI:  Shoulder shrug equal, head turn equal  XII:  Tongue midline without fasciculations      MOTOR: Tone and bulk: normal     MUSCLE STRENGTH:  Hip Flexion: Right 5/5, Left 5/5  Hip Extension: Right 5/5, Left 5/5  Leg Flexion: Right 5/5, Left 5/5  Leg Extension: Right 5/5, Left 5/5  Plantar Flexion: Right 5/5, Left 5/5  Dorsiflexion: Right 5/5, Left 5/5    Delt Bi Tri     Right: 5/5 5/5 5/5 5/5   Left: 5/5 5/5 5/5 5/5     SENSATION: Light touch and pinprick intact bilaterally  REFLEXES: normal, symmetric, nonbrisk.  Toes down, no clonus. Negative corrales's sign bilaterally.  GAIT: antalgic gait; uses a single point cane for assistance with ambulation       Imaging:  "per last PCP visit "Last x-ray was in 4/2022 and revealed: Again noted is severe narrowing of the medial joint space with medial lateral marginal osteophytes. Prominent patellofemoral joint space narrowing and osteophytes. No acute fracture or dislocation. No joint effusion. Soft tissues normal."    Assessment: Bridgette Barnes is a 67 y.o. male with PMH significant for morbid obesity, HTN, pre-diabetes, and atrial fibrillation on Eliquis presents as an established patient for the management of left knee pain. The patient presents today for medication refill.  The patient would also like to discuss left knee coolief procedure. Patient with severe OA of the left knee. Patient reports that he is unable to get a knee arthroplasty given his morbid obesity. Patient continues to localize worse pain to his left knee. The patient reports benefit with Percocet.  Treatment plan outlined below.     Plan:  - Schedule for left knee genicular nerve block may proceed to Coolief if successful. Pt agrees to proceed with procedure in [place] with Dr. Almeida.   - Refilled Percocet  mg PO q 12 hr PRN for breakthrough pain; # 60 tablets; 1 refills.  reviewed without discrepancies.   - UDS collected today, last had pain medication yesterday.   - I have stressed the importance of physical activity and a home exercise plan to help with chronic pain and improve health.  - Discussed Dr. Galindo's previous med management plan with patient.  (Explained to the patient that I do not intend to manage his left knee pathology with chronic opioids for the rest of his life as that is not appropriate. I outlined that the patient must make substantial progress in regards to weight loss with progression to left knee arthroplasty within 6 months to 1 year. After which, I will no longer provide the patient with opioids. Patient in agreement.)  - RTC for the procedure as outlined above   All medication management performed by Dr. Galindo.    Kriss Tan, " NP

## 2022-12-09 LAB
6MAM UR QL: NOT DETECTED
7AMINOCLONAZEPAM UR QL: NOT DETECTED
A-OH ALPRAZ UR QL: NOT DETECTED
ALPHA-OH-MIDAZOLAM: NOT DETECTED
ALPRAZ UR QL: NOT DETECTED
AMPHET UR QL SCN: NOT DETECTED
ANNOTATION COMMENT IMP: NORMAL
ANNOTATION COMMENT IMP: NORMAL
BARBITURATES UR QL: NOT DETECTED
BUPRENORPHINE UR QL: NOT DETECTED
BZE UR QL: NOT DETECTED
CARBOXYTHC UR QL: NOT DETECTED
CARISOPRODOL UR QL: NOT DETECTED
CLONAZEPAM UR QL: NOT DETECTED
CODEINE UR QL: NOT DETECTED
CREAT UR-MCNC: 158.6 MG/DL (ref 20–400)
DIAZEPAM UR QL: NOT DETECTED
ETHYL GLUCURONIDE UR QL: NOT DETECTED
FENTANYL UR QL: NOT DETECTED
GABAPENTIN: NOT DETECTED
HYDROCODONE UR QL: NOT DETECTED
HYDROMORPHONE UR QL: NOT DETECTED
LORAZEPAM UR QL: NOT DETECTED
MDA UR QL: NOT DETECTED
MDEA UR QL: NOT DETECTED
MDMA UR QL: NOT DETECTED
ME-PHENIDATE UR QL: NOT DETECTED
METHADONE UR QL: NOT DETECTED
METHAMPHET UR QL: NOT DETECTED
MIDAZOLAM UR QL SCN: NOT DETECTED
MORPHINE UR QL: NOT DETECTED
NALOXONE: NOT DETECTED
NORBUPRENORPHINE UR QL CFM: NOT DETECTED
NORDIAZEPAM UR QL: NOT DETECTED
NORFENTANYL UR QL: NOT DETECTED
NORHYDROCODONE UR QL CFM: NOT DETECTED
NORMEPERIDINE UR QL CFM: NOT DETECTED
NOROXYCODONE UR QL CFM: PRESENT
NOROXYMORPHONE UR QL SCN: PRESENT
OXAZEPAM UR QL: NOT DETECTED
OXYCODONE UR QL: PRESENT
OXYMORPHONE UR QL: PRESENT
PATHOLOGY STUDY: NORMAL
PCP UR QL: NOT DETECTED
PHENTERMINE UR QL: NOT DETECTED
PREGABALIN: NOT DETECTED
SERVICE CMNT-IMP: NORMAL
TAPENTADOL UR QL SCN: NOT DETECTED
TAPENTADOL UR QL SCN: NOT DETECTED
TEMAZEPAM UR QL: NOT DETECTED
TRAMADOL UR QL: NOT DETECTED
ZOLPIDEM METABOLITE: NOT DETECTED
ZOLPIDEM UR QL: NOT DETECTED

## 2023-01-09 ENCOUNTER — TELEPHONE (OUTPATIENT)
Dept: BARIATRICS | Facility: CLINIC | Age: 68
End: 2023-01-09
Payer: MEDICARE

## 2023-01-09 DIAGNOSIS — E66.01 MORBID OBESITY WITH BODY MASS INDEX (BMI) GREATER THAN OR EQUAL TO 50: Primary | ICD-10-CM

## 2023-01-09 NOTE — TELEPHONE ENCOUNTER
Returned call to pt to reschedule UGI. Need clarification as to which location he wants it done. No answer, LMOM.

## 2023-01-09 NOTE — TELEPHONE ENCOUNTER
Pt returned call to office. UGI rescheduled at [place] per his request and Appt with Dr. Burgos also rescheduled to sooner date. Pt agreed to dates/times, location verified.

## 2023-01-12 ENCOUNTER — HOSPITAL ENCOUNTER (OUTPATIENT)
Dept: RADIOLOGY | Facility: HOSPITAL | Age: 68
Discharge: HOME | End: 2023-01-12
Attending: SURGERY
Payer: MEDICARE

## 2023-01-12 DIAGNOSIS — E66.01 MORBID OBESITY: ICD-10-CM

## 2023-01-12 PROCEDURE — 25500020 PHARM REV CODE 255: Performed by: SURGERY

## 2023-01-12 PROCEDURE — 74240 X-RAY XM UPR GI TRC 1CNTRST: CPT | Mod: TC

## 2023-01-12 PROCEDURE — 74240 X-RAY XM UPR GI TRC 1CNTRST: CPT | Mod: 26,,, | Performed by: RADIOLOGY

## 2023-01-12 PROCEDURE — A9698 NON-RAD CONTRAST MATERIALNOC: HCPCS | Performed by: SURGERY

## 2023-01-12 PROCEDURE — 74240 FL UPPER GI: ICD-10-PCS | Mod: 26,,, | Performed by: RADIOLOGY

## 2023-01-12 RX ORDER — SODIUM CHLORIDE, SODIUM LACTATE, POTASSIUM CHLORIDE, CALCIUM CHLORIDE 600; 310; 30; 20 MG/100ML; MG/100ML; MG/100ML; MG/100ML
INJECTION, SOLUTION INTRAVENOUS ONCE AS NEEDED
Status: CANCELLED | OUTPATIENT
Start: 2023-01-12 | End: 2034-06-10

## 2023-01-12 RX ADMIN — BARIUM SULFATE 75 ML: 980 POWDER, FOR SUSPENSION ORAL at 10:01

## 2023-01-12 RX ADMIN — BARIUM SULFATE 175 ML: 0.6 SUSPENSION ORAL at 10:01

## 2023-01-17 ENCOUNTER — TELEPHONE (OUTPATIENT)
Dept: PAIN MEDICINE | Facility: CLINIC | Age: 68
End: 2023-01-17
Payer: MEDICARE

## 2023-01-17 NOTE — TELEPHONE ENCOUNTER
Pt is on eliquis is having a RFA tomorrow, OR is checking that he doesn't have to hold Eliquis. It is not on my sheet of meds to be held, but since the OR asked I was double checking with MD.

## 2023-01-17 NOTE — PRE-PROCEDURE INSTRUCTIONS
PHONE PRE-OP WAS DONE. ICOUGH INSTRUCTIONS GIVEN. PATIENT VERBALIZED UNDERSTANDING OF ALL INSTRUCTIONS AND EDUCATION.

## 2023-01-17 NOTE — PROGRESS NOTES
Patient states has no ride home. Dr. Almeida informed states ok to drive self home with just local.

## 2023-01-18 ENCOUNTER — HOSPITAL ENCOUNTER (OUTPATIENT)
Facility: HOSPITAL | Age: 68
Discharge: HOME | End: 2023-01-18
Attending: ANESTHESIOLOGY | Admitting: ANESTHESIOLOGY
Payer: MEDICARE

## 2023-01-18 VITALS
BODY MASS INDEX: 44.1 KG/M2 | WEIGHT: 315 LBS | HEIGHT: 71 IN | DIASTOLIC BLOOD PRESSURE: 94 MMHG | TEMPERATURE: 98 F | OXYGEN SATURATION: 96 % | HEART RATE: 70 BPM | RESPIRATION RATE: 18 BRPM | SYSTOLIC BLOOD PRESSURE: 197 MMHG

## 2023-01-18 DIAGNOSIS — M25.569 KNEE PAIN: ICD-10-CM

## 2023-01-18 PROCEDURE — 64454 NJX AA&/STRD GNCLR NRV BRNCH: CPT | Mod: LT,,, | Performed by: ANESTHESIOLOGY

## 2023-01-18 PROCEDURE — 36000705 HC OR TIME LEV I EA ADD 15 MIN: Performed by: ANESTHESIOLOGY

## 2023-01-18 PROCEDURE — 64454 PR NERVE BLOCK INJ, ANES/STEROID, GENICULAR NERVE, W/IMG: ICD-10-PCS | Mod: LT,,, | Performed by: ANESTHESIOLOGY

## 2023-01-18 PROCEDURE — 36000704 HC OR TIME LEV I 1ST 15 MIN: Performed by: ANESTHESIOLOGY

## 2023-01-18 PROCEDURE — 99900103 DSU ONLY-NO CHARGE-INITIAL HR (STAT): Performed by: ANESTHESIOLOGY

## 2023-01-18 PROCEDURE — 99900104 DSU ONLY-NO CHARGE-EA ADD'L HR (STAT): Performed by: ANESTHESIOLOGY

## 2023-01-18 PROCEDURE — 25000003 PHARM REV CODE 250: Performed by: ANESTHESIOLOGY

## 2023-01-18 RX ORDER — LIDOCAINE HYDROCHLORIDE 10 MG/ML
INJECTION, SOLUTION EPIDURAL; INFILTRATION; INTRACAUDAL; PERINEURAL
Status: DISCONTINUED | OUTPATIENT
Start: 2023-01-18 | End: 2023-01-18 | Stop reason: HOSPADM

## 2023-01-18 RX ORDER — BUPIVACAINE HYDROCHLORIDE 5 MG/ML
INJECTION, SOLUTION EPIDURAL; INTRACAUDAL
Status: DISCONTINUED | OUTPATIENT
Start: 2023-01-18 | End: 2023-01-18 | Stop reason: HOSPADM

## 2023-01-18 NOTE — OP NOTE
PROCEDURE DATE: 1/18/2023    PROCEDURE: Superior lateral, Superior medial and Inferior medial genicular nerve blocks, left-sided    Diagnosis: Left knee pain     Post Op Diagnosis: Same     PHYSICIAN: Kelechi Almeida M.D.     LOCAL ANESTHESIA: Lidocaine 1%, 3 ml total.     MEDICATION INJECTED: 2% Lidocaine 2ml at each nerve     SEDATION MEDICATIONS: None    COMPLICATIONS: None     ESTIMATED BLOOD LOSS: None     TECHNIQUE: A time-out was taken to identify patient and procedure side prior to starting procedure.   With the patient laying supine and the knees semi-flexed, the left knee was prepped and draped in the usual sterile fashion using ChloraPrep and a fenestrated drape. Knee joint line was determined under fluoroscopic guidance. The targets included the superior lateral (SL), superior medial (SM) and inferior medial (IM) genicular nerves which past periosteal areas connecting the shaft of the femur to bilateral epicondyles and the shaft of tibia to the medial epicondyle. The local anesthetic was given using a 25-gauge 1.5 inch needle. 3.5in 25g spinal needed was introduced into each target area. 1ml contrast was injected to confirm placement and to rule out intravascular uptake. After negative aspiration for blood, the medication was then injected. The patient tolerated the procedure well.     If found to have greater than a 50% recovery and so will be scheduled for a radiofrequency ablation of the corresponding nerves.     Patient was given post procedure and discharge instructions to follow at home. The patient was discharged in a stable condition

## 2023-01-18 NOTE — H&P
"CC: knee pain    HPI: The patient is a 67 y.o. male with a history of knee pain here for blocks. There are no major changes in history and physical from 12/5/22 by Kriss.    Past Medical History:   Diagnosis Date    Atrial fibrillation     CKD (chronic kidney disease), stage III     Diabetes mellitus     Gout, unspecified     Hyperlipidemia     Hypertension     Sleep apnea uses CPAP        Past Surgical History:   Procedure Laterality Date    ILIAC ARTERY STENT         Family History   Problem Relation Age of Onset    Cancer Mother     Lung cancer Mother     Cancer Father     Lung cancer Father        Social History     Socioeconomic History    Marital status: Single   Tobacco Use    Smoking status: Never    Smokeless tobacco: Never   Substance and Sexual Activity    Alcohol use: Not Currently    Drug use: Never    Sexual activity: Not Currently       No current facility-administered medications for this encounter.       Review of patient's allergies indicates:  No Known Allergies    Vitals:    01/18/23 0630   BP: (!) 166/91   Pulse: 64   Resp: 18   Temp: 98.2 °F (36.8 °C)   TempSrc: Skin   SpO2: 97%   Weight: (!) 176.9 kg (390 lb)   Height: 5' 11" (1.803 m)       REVIEW OF SYSTEMS:     GENERAL: No weight loss, malaise or fevers.  HEENT:  No recent changes in vision or hearing  NECK: Negative for lumps, no difficulty with swallowing.  RESPIRATORY: Negative for cough, wheezing or shortness of breath, patient denies any recent URI.  CARDIOVASCULAR: Negative for chest pain, leg swelling or palpitations.  GI: Negative for abdominal discomfort, blood in stools or black stools or change in bowel habits.  MUSCULOSKELETAL: See HPI.  SKIN: Negative for lesions, rash, and itching.  PSYCH: No suicidal or homicidal ideations, no current mood disturbances.  HEMATOLOGY/LYMPHOLOGY: Negative for prolonged bleeding, bruising easily or swollen nodes. Patient is not currently taking any anti-coagulants  ENDO: No history of diabetes or " thyroid dysfunction  NEURO: No history of syncope, paralysis, seizures or tremors.All other reviewed and negative other than HPI.    Physical exam:  Gen: A and O x3, pleasant, well-groomed  Skin: No rashes or obvious lesions  HEENT: PERRLA, no obvious deformities on ears or in canals. No thyroid masses, trachea midline, no palpable lymph nodes in neck, axilla.  CVS: Regular rate and rhythm, normal S1 and S2, no murmurs.  Resp: Clear to auscultation bilaterally.  Abdomen: Soft, NT/ND, normal bowel sounds present.  Musculoskeletal/Neuro: Moving all extremities    Assessment:  Knee pain          PLAN: Knee Blocks      This patient has been cleared for surgery in an ambulatory surgical facility    ASA 3,  Mallampatti Score 3  No history of anesthetic complications  Plan for RN IV sedation

## 2023-01-18 NOTE — DISCHARGE SUMMARY
[facility]  Discharge Note  Short Stay    Procedure(s) (LRB):  Block, Nerve, Genicular, Knee (Left)      OUTCOME: Patient tolerated treatment/procedure well without complication and is now ready for discharge.    DISPOSITION: Home or Self Care    FINAL DIAGNOSIS:  <principal problem not specified>    FOLLOWUP: In clinic    DISCHARGE INSTRUCTIONS:    Discharge Procedure Orders   Notify your health care provider if you experience any of the following:  temperature >100.4     Notify your health care provider if you experience any of the following:  severe uncontrolled pain     Notify your health care provider if you experience any of the following:  redness, tenderness, or signs of infection (pain, swelling, redness, odor or green/yellow discharge around incision site)     Activity as tolerated        TIME SPENT ON DISCHARGE:   30 minutes

## 2023-01-18 NOTE — PLAN OF CARE
PT TO RECOVERY FROM procedure. ,VITALS STABLE, NAD. PT RECEIVED NO SEDATION. , PUNCTURE SITED TO LEFT KNEE NOTED , CLEAN, DRY, INTACT

## 2023-01-19 ENCOUNTER — TELEPHONE (OUTPATIENT)
Dept: PAIN MEDICINE | Facility: CLINIC | Age: 68
End: 2023-01-19
Payer: MEDICARE

## 2023-01-19 NOTE — TELEPHONE ENCOUNTER
----- Message from Ashley Sung sent at 1/19/2023  3:22 PM CST -----  Regarding: pt call back requested  Name of Who is Calling:BEULAH KIRKLAND [[id number]]          What is the request in detail: pt call back requested           Can the clinic reply by [facility]: no            What Number to Call Back if not in [facility]: [phone] (home)        normal

## 2023-01-19 NOTE — TELEPHONE ENCOUNTER
Pt called back, said he didn't get any relief from from the procedure yesterday 1/18/23 the knee block. Do you want a F/U appt.?  Please advise. Thank you.

## 2023-01-26 ENCOUNTER — TELEPHONE (OUTPATIENT)
Dept: PAIN MEDICINE | Facility: CLINIC | Age: 68
End: 2023-01-26
Payer: MEDICARE

## 2023-01-26 NOTE — TELEPHONE ENCOUNTER
----- Message from Hoa San sent at 1/26/2023  9:50 AM CST -----  Type:  Patient Returning Call  Who Called:  Pt   Who Left Message for Patient:  Tiffany  Does the patient know what this is regarding?:  f/u with [facility]  Best Call Back Number:  [phone]  Additional Information:  pt requesting a call back from Tiffany.

## 2023-01-30 ENCOUNTER — OFFICE VISIT (OUTPATIENT)
Dept: PAIN MEDICINE | Facility: CLINIC | Age: 68
End: 2023-01-30
Payer: MEDICARE

## 2023-01-30 VITALS — BODY MASS INDEX: 54.71 KG/M2 | WEIGHT: 315 LBS

## 2023-01-30 DIAGNOSIS — G89.29 CHRONIC PAIN OF LEFT KNEE: Primary | ICD-10-CM

## 2023-01-30 DIAGNOSIS — R53.81 PHYSICAL DECONDITIONING: ICD-10-CM

## 2023-01-30 DIAGNOSIS — M25.562 CHRONIC PAIN OF LEFT KNEE: Primary | ICD-10-CM

## 2023-01-30 DIAGNOSIS — M17.0 PRIMARY OSTEOARTHRITIS OF BOTH KNEES: Primary | ICD-10-CM

## 2023-01-30 DIAGNOSIS — Z79.891 CHRONIC USE OF OPIATE FOR THERAPEUTIC PURPOSE: ICD-10-CM

## 2023-01-30 DIAGNOSIS — M17.12 PRIMARY OSTEOARTHRITIS OF LEFT KNEE: ICD-10-CM

## 2023-01-30 DIAGNOSIS — E66.01 MORBID OBESITY WITH BMI OF 50.0-59.9, ADULT: ICD-10-CM

## 2023-01-30 PROCEDURE — 99214 OFFICE O/P EST MOD 30 MIN: CPT | Mod: S$GLB,,,

## 2023-01-30 PROCEDURE — 1160F RVW MEDS BY RX/DR IN RCRD: CPT | Mod: CPTII,S$GLB,,

## 2023-01-30 PROCEDURE — 1101F PT FALLS ASSESS-DOCD LE1/YR: CPT | Mod: CPTII,S$GLB,,

## 2023-01-30 PROCEDURE — 3288F FALL RISK ASSESSMENT DOCD: CPT | Mod: CPTII,S$GLB,,

## 2023-01-30 PROCEDURE — 99999 PR PBB SHADOW E&M-EST. PATIENT-LVL III: CPT | Mod: PBBFAC,,,

## 2023-01-30 PROCEDURE — 99214 PR OFFICE/OUTPT VISIT, EST, LEVL IV, 30-39 MIN: ICD-10-PCS | Mod: S$GLB,,,

## 2023-01-30 PROCEDURE — 1101F PR PT FALLS ASSESS DOC 0-1 FALLS W/OUT INJ PAST YR: ICD-10-PCS | Mod: CPTII,S$GLB,,

## 2023-01-30 PROCEDURE — 3008F BODY MASS INDEX DOCD: CPT | Mod: CPTII,S$GLB,,

## 2023-01-30 PROCEDURE — 1159F PR MEDICATION LIST DOCUMENTED IN MEDICAL RECORD: ICD-10-PCS | Mod: CPTII,S$GLB,,

## 2023-01-30 PROCEDURE — 99999 PR PBB SHADOW E&M-EST. PATIENT-LVL III: ICD-10-PCS | Mod: PBBFAC,,,

## 2023-01-30 PROCEDURE — 80326 AMPHETAMINES 5 OR MORE: CPT

## 2023-01-30 PROCEDURE — 3008F PR BODY MASS INDEX (BMI) DOCUMENTED: ICD-10-PCS | Mod: CPTII,S$GLB,,

## 2023-01-30 PROCEDURE — 3288F PR FALLS RISK ASSESSMENT DOCUMENTED: ICD-10-PCS | Mod: CPTII,S$GLB,,

## 2023-01-30 PROCEDURE — 1160F PR REVIEW ALL MEDS BY PRESCRIBER/CLIN PHARMACIST DOCUMENTED: ICD-10-PCS | Mod: CPTII,S$GLB,,

## 2023-01-30 PROCEDURE — 1159F MED LIST DOCD IN RCRD: CPT | Mod: CPTII,S$GLB,,

## 2023-01-30 RX ORDER — OXYCODONE AND ACETAMINOPHEN 10; 325 MG/1; MG/1
1 TABLET ORAL EVERY 12 HOURS PRN
Qty: 60 TABLET | Refills: 0 | Status: SHIPPED | OUTPATIENT
Start: 2023-02-03 | End: 2023-02-02 | Stop reason: SDUPTHER

## 2023-01-30 RX ORDER — OXYCODONE AND ACETAMINOPHEN 10; 325 MG/1; MG/1
1 TABLET ORAL EVERY 12 HOURS PRN
Qty: 60 TABLET | Refills: 0 | Status: SHIPPED | OUTPATIENT
Start: 2023-03-31 | End: 2023-02-02 | Stop reason: SDUPTHER

## 2023-01-30 RX ORDER — OXYCODONE AND ACETAMINOPHEN 10; 325 MG/1; MG/1
1 TABLET ORAL EVERY 12 HOURS PRN
Qty: 60 TABLET | Refills: 0 | Status: SHIPPED | OUTPATIENT
Start: 2023-03-03 | End: 2023-02-02 | Stop reason: SDUPTHER

## 2023-01-30 NOTE — PROGRESS NOTES
Referring Physician: No ref. provider found    PCP: Rosalinda Stockton NP    CC: left knee pain    INTERVAL HPI: Bridgette Barnes is a 67 y.o. male with PMH significant for morbid obesity, HTN, pre-diabetes, and atrial fibrillation on Eliquis presents as an established patient for the management of left knee pain. The patient is s/p left knee genicular nerve block on 1/18/2023 and reports of no relief.  The patient presents today for medication refill. Today, the patient continues to localize his worse pain to his left knee. The patient localizes his pain to the anterior aspect of his left knee. The patient reports of radiation down his left shin. The patient describes his pain as a sharp type of pain. The patient denies of numbness. The patient reports that his pain is a 9/10.  The patient reports benefit with Percocet 10 but does report of headaches with medicaiton. In regards to weight loss, the patient reports he has not been able to lose weight. The patient reports he is still under Dr. Burgos's care for bariatric surgery in the future. The patient reports he plans to under go knee replacement s/p gastric sleeve. The patient denies of any significant changes in his health since his last appointment. The patient also denies of any changes in the character of his pain since his last appointment.   Patient denies of any urinary/fecal incontinence, saddle anesthesia, or weakness.     HPI:   Bridgette Barnes is a 67 y.o. male with PMH significant for morbid obesity, HTN, pre-diabetes, and atrial fibrillation on Eliquis presents for the evaluation of left knee pain. The patient reports that his pain began approximately since high school s/p football injury. The patient denies of prior left knee surgery. The patient reports of worsening severity since onset. The patient localizes his pain to the anterior aspect of his left knee. The patient reports of radiation down his left shin. The patient describes his pain as a  sharp type of pain. The patient denies of numbness. The patient reports that his pain is a 8-10/10. Patient denies of any urinary/fecal incontinence, saddle anesthesia, or weakness.     Aggravating factors: walking, standing    Mitigating factors: pain somewhat improved after he gets moving; minimal relief with Norco  mg     Relevant Surgeries: no; Dr. Romero at [facility]; was told he had to lose weight prior to arthroplasty          Interventional Therapies: yes; steroid injections with minimal, short lived relief. Tried viscosupplementation without benefit.   01/18/2023: Left knee genicular nerve block-     : Reviewed and consistent with medication use as prescribed.                   Pain Medications:         Currently taking: Percocet 10-325mg po q12 hrs.     Anticoagulation: yes; Eliquis    ROS:  Review of Systems   Constitutional:  Negative for chills and fever.   HENT:  Negative for tinnitus.    Eyes:  Negative for visual disturbance.   Respiratory:  Negative for shortness of breath.    Cardiovascular:  Negative for chest pain.   Gastrointestinal:  Negative for nausea and vomiting.   Genitourinary:  Negative for difficulty urinating.   Musculoskeletal:  Positive for arthralgias and gait problem.   Skin:  Negative for rash.   Allergic/Immunologic: Negative for immunocompromised state.   Neurological:  Negative for syncope and numbness.   Hematological:  Does not bruise/bleed easily.   Psychiatric/Behavioral:  Negative for suicidal ideas.    All other systems reviewed and are negative.     MEDICAL, SURGICAL, FAMILY, SOCIAL HX: reviewed    MEDICATIONS/ALLERGIES: reviewed         Allergies: See med card    There were no vitals filed for this visit.        Physical exam:  Physical Exam  Vitals and nursing note reviewed.   Constitutional:       Appearance: Normal appearance. He is not toxic-appearing or diaphoretic.   HENT:      Head: Normocephalic and atraumatic.   Eyes:      General:          Right eye: No discharge.         Left eye: No discharge.      Extraocular Movements: Extraocular movements intact.      Conjunctiva/sclera: Conjunctivae normal.   Cardiovascular:      Rate and Rhythm: Normal rate.   Pulmonary:      Effort: Pulmonary effort is normal. No respiratory distress.      Breath sounds: Normal breath sounds.   Abdominal:      Palpations: Abdomen is soft.   Skin:     General: Skin is warm and dry.      Findings: No rash.   Neurological:      Mental Status: He is alert and oriented to person, place, and time.   Psychiatric:         Mood and Affect: Mood and affect normal.         Cognition and Memory: Memory normal.         Judgment: Judgment normal.        UPPER EXTREMITIES: Normal alignment, normal range of motion, no atrophy, no skin changes,  hair growth and nail growth normal and equal bilaterally. No swelling, no tenderness.    LOWER EXTREMITIES:  Normal alignment, normal range of motion, no atrophy, no skin changes,  hair growth and nail growth normal and equal bilaterally. No swelling, no tenderness.    Knee exam:    Extension/flexion equal bilaterally.   Positive crepitus with in the left knee  No effusion both knees.    Positive joint line tenderness     CRANIAL NERVES:  II:  PERRL bilaterally,   III,IV,VI: EOMI.    V:  Facial sensation equal bilaterally  VII:  Facial motor function normal.  VIII:  Hearing equal to finger rub bilaterally  IX/X: Gag normal, palate symmetric  XI:  Shoulder shrug equal, head turn equal  XII:  Tongue midline without fasciculations      MOTOR: Tone and bulk: normal     MUSCLE STRENGTH:  Hip Flexion: Right 5/5, Left 5/5  Hip Extension: Right 5/5, Left 5/5  Leg Flexion: Right 5/5, Left 5/5  Leg Extension: Right 5/5, Left 5/5  Plantar Flexion: Right 5/5, Left 5/5  Dorsiflexion: Right 5/5, Left 5/5    Delt Bi Tri     Right: 5/5 5/5 5/5 5/5   Left: 5/5 5/5 5/5 5/5     SENSATION: Light touch and pinprick intact bilaterally  REFLEXES: normal, symmetric,  "nonbrisk.  Toes down, no clonus. Negative corrales's sign bilaterally.  GAIT: antalgic gait; uses a single point cane for assistance with ambulation       Imaging: per last PCP visit "Last x-ray was in 4/2022 and revealed: Again noted is severe narrowing of the medial joint space with medial lateral marginal osteophytes. Prominent patellofemoral joint space narrowing and osteophytes. No acute fracture or dislocation. No joint effusion. Soft tissues normal."    Assessment: Bridgette Barnes is a 67 y.o. male with PMH significant for morbid obesity, HTN, pre-diabetes, and atrial fibrillation on Eliquis presents as an established patient for the management of left knee pain. The patient presents today for medication refill.  The patient is s/p right knee genicular nerve block with no relief.  Patient with severe OA of the left knee. Patient reports that he is unable to get a knee arthroplasty given his morbid obesity. Patient continues to localize worse pain to his left knee. The patient reports benefit with Percocet.  Treatment plan outlined below.     Encounter Diagnoses   Name Primary?    Chronic use of opiate for therapeutic purpose     Primary osteoarthritis of both knees Yes    Morbid obesity with BMI of 50.0-59.9, adult     Physical deconditioning         Plan:  - I discussed the plan of care with the patient and encouraged progression towards weight loss goals.  Pt verb understanding.   - Refilled Percocet  mg PO q 12 hr PRN for breakthrough pain; # 60 tablets; 2 refills.  reviewed without discrepancies.   - UDS collected today, last had pain medication yesterday.   - I have stressed the importance of physical activity and a home exercise plan to help with chronic pain and improve health.  - I discussed PT, pt deferred at this time due to funds for transportation to and from PT.   - Discussed Dr. Galindo's previous med management plan with patient.  (Explained to the patient that I do not intend to manage " his left knee pathology with chronic opioids for the rest of his life as that is not appropriate. I outlined that the patient must make substantial progress in regards to weight loss with progression to left knee arthroplasty within 6 months to 1 year. After which, I will no longer provide the patient with opioids. Patient in agreement.)  - F/U 3 months or sooner if need. I advised the patient that if he did not have a plan to reach weight loss goals we would no longer continue opioid therapy as agreed upon during initial visit.   All medication management performed by Dr. Almeida.    Kriss Tan, NP

## 2023-02-02 ENCOUNTER — OFFICE VISIT (OUTPATIENT)
Dept: SURGERY | Facility: CLINIC | Age: 68
End: 2023-02-02
Payer: MEDICARE

## 2023-02-02 ENCOUNTER — TELEPHONE (OUTPATIENT)
Dept: PAIN MEDICINE | Facility: CLINIC | Age: 68
End: 2023-02-02
Payer: MEDICARE

## 2023-02-02 VITALS
HEIGHT: 71 IN | WEIGHT: 315 LBS | RESPIRATION RATE: 16 BRPM | SYSTOLIC BLOOD PRESSURE: 191 MMHG | BODY MASS INDEX: 44.1 KG/M2 | HEART RATE: 52 BPM | TEMPERATURE: 98 F | DIASTOLIC BLOOD PRESSURE: 86 MMHG

## 2023-02-02 DIAGNOSIS — E66.01 MORBID OBESITY WITH BMI OF 50.0-59.9, ADULT: ICD-10-CM

## 2023-02-02 DIAGNOSIS — E66.01 MORBID OBESITY: Primary | ICD-10-CM

## 2023-02-02 DIAGNOSIS — M17.12 PRIMARY OSTEOARTHRITIS OF LEFT KNEE: ICD-10-CM

## 2023-02-02 DIAGNOSIS — G89.29 CHRONIC PAIN OF LEFT KNEE: ICD-10-CM

## 2023-02-02 DIAGNOSIS — M25.562 CHRONIC PAIN OF LEFT KNEE: ICD-10-CM

## 2023-02-02 DIAGNOSIS — I10 HYPERTENSION, UNSPECIFIED TYPE: ICD-10-CM

## 2023-02-02 DIAGNOSIS — N18.30 STAGE 3 CHRONIC KIDNEY DISEASE, UNSPECIFIED WHETHER STAGE 3A OR 3B CKD: ICD-10-CM

## 2023-02-02 DIAGNOSIS — G47.33 OSA (OBSTRUCTIVE SLEEP APNEA): ICD-10-CM

## 2023-02-02 DIAGNOSIS — E11.9 TYPE 2 DIABETES MELLITUS WITHOUT COMPLICATION, WITHOUT LONG-TERM CURRENT USE OF INSULIN: ICD-10-CM

## 2023-02-02 PROCEDURE — 1101F PR PT FALLS ASSESS DOC 0-1 FALLS W/OUT INJ PAST YR: ICD-10-PCS | Mod: CPTII,S$GLB,, | Performed by: SURGERY

## 2023-02-02 PROCEDURE — 1125F PR PAIN SEVERITY QUANTIFIED, PAIN PRESENT: ICD-10-PCS | Mod: CPTII,S$GLB,, | Performed by: SURGERY

## 2023-02-02 PROCEDURE — 99999 PR PBB SHADOW E&M-EST. PATIENT-LVL IV: CPT | Mod: PBBFAC,,, | Performed by: SURGERY

## 2023-02-02 PROCEDURE — 3288F FALL RISK ASSESSMENT DOCD: CPT | Mod: CPTII,S$GLB,, | Performed by: SURGERY

## 2023-02-02 PROCEDURE — 3077F SYST BP >= 140 MM HG: CPT | Mod: CPTII,S$GLB,, | Performed by: SURGERY

## 2023-02-02 PROCEDURE — 1159F MED LIST DOCD IN RCRD: CPT | Mod: CPTII,S$GLB,, | Performed by: SURGERY

## 2023-02-02 PROCEDURE — 3077F PR MOST RECENT SYSTOLIC BLOOD PRESSURE >= 140 MM HG: ICD-10-PCS | Mod: CPTII,S$GLB,, | Performed by: SURGERY

## 2023-02-02 PROCEDURE — 3288F PR FALLS RISK ASSESSMENT DOCUMENTED: ICD-10-PCS | Mod: CPTII,S$GLB,, | Performed by: SURGERY

## 2023-02-02 PROCEDURE — 99213 PR OFFICE/OUTPT VISIT, EST, LEVL III, 20-29 MIN: ICD-10-PCS | Mod: S$GLB,,, | Performed by: SURGERY

## 2023-02-02 PROCEDURE — 99999 PR PBB SHADOW E&M-EST. PATIENT-LVL IV: ICD-10-PCS | Mod: PBBFAC,,, | Performed by: SURGERY

## 2023-02-02 PROCEDURE — 3079F DIAST BP 80-89 MM HG: CPT | Mod: CPTII,S$GLB,, | Performed by: SURGERY

## 2023-02-02 PROCEDURE — 3079F PR MOST RECENT DIASTOLIC BLOOD PRESSURE 80-89 MM HG: ICD-10-PCS | Mod: CPTII,S$GLB,, | Performed by: SURGERY

## 2023-02-02 PROCEDURE — 1125F AMNT PAIN NOTED PAIN PRSNT: CPT | Mod: CPTII,S$GLB,, | Performed by: SURGERY

## 2023-02-02 PROCEDURE — 99213 OFFICE O/P EST LOW 20 MIN: CPT | Mod: S$GLB,,, | Performed by: SURGERY

## 2023-02-02 PROCEDURE — 3008F BODY MASS INDEX DOCD: CPT | Mod: CPTII,S$GLB,, | Performed by: SURGERY

## 2023-02-02 PROCEDURE — 3008F PR BODY MASS INDEX (BMI) DOCUMENTED: ICD-10-PCS | Mod: CPTII,S$GLB,, | Performed by: SURGERY

## 2023-02-02 PROCEDURE — 1159F PR MEDICATION LIST DOCUMENTED IN MEDICAL RECORD: ICD-10-PCS | Mod: CPTII,S$GLB,, | Performed by: SURGERY

## 2023-02-02 PROCEDURE — 1101F PT FALLS ASSESS-DOCD LE1/YR: CPT | Mod: CPTII,S$GLB,, | Performed by: SURGERY

## 2023-02-02 RX ORDER — OXYCODONE AND ACETAMINOPHEN 10; 325 MG/1; MG/1
1 TABLET ORAL EVERY 12 HOURS PRN
Qty: 60 TABLET | Refills: 0 | Status: SHIPPED | OUTPATIENT
Start: 2023-03-03 | End: 2023-04-26 | Stop reason: SDUPTHER

## 2023-02-02 RX ORDER — OXYCODONE AND ACETAMINOPHEN 10; 325 MG/1; MG/1
1 TABLET ORAL EVERY 12 HOURS PRN
Qty: 60 TABLET | Refills: 0 | Status: SHIPPED | OUTPATIENT
Start: 2023-02-03 | End: 2023-04-26 | Stop reason: SDUPTHER

## 2023-02-02 RX ORDER — OXYCODONE AND ACETAMINOPHEN 10; 325 MG/1; MG/1
1 TABLET ORAL EVERY 12 HOURS PRN
Qty: 60 TABLET | Refills: 0 | Status: SHIPPED | OUTPATIENT
Start: 2023-03-31 | End: 2023-05-01 | Stop reason: SDUPTHER

## 2023-02-02 NOTE — TELEPHONE ENCOUNTER
----- Message from Nida Davis sent at 2/2/2023 12:08 PM CST -----  Regarding: pharmacy called  heather Calling to Clarify an RX        Name of Caller    Etta     [id number] - [place], [place] - [address]  [address]  [place] [address]  Phone: [phone] Fax: [phone]          Prescription Name    Oxy oxyCODONE-acetaminophen (PERCOCET)  mg per     What do they need to clarify?  The drs andres number for the state of [place] needs to be sent along with a new rx please advise      Best Call Back Number         Additional Information:

## 2023-02-02 NOTE — PROGRESS NOTES
Medically Supervised Weight Loss Documentation    Date of Visit: 02/02/2023    Patient: Bridgette Barnes    Current Weight: 394  Current BMI: Body mass index is 54.98 kg/m².  Weight Change: - 6    Last Weight: 400    Beginning Weight: 400      Vitals:   Vitals:    02/02/23 1327   BP: (!) 191/86   Pulse: (!) 52   Resp: 16   Temp: 97.9 °F (36.6 °C)       Comorbidities:   Past Medical History:   Diagnosis Date    Atrial fibrillation     CKD (chronic kidney disease), stage III     Diabetes mellitus     Gout, unspecified     Hyperlipidemia     Hypertension     Sleep apnea uses CPAP        Medications:  Current Outpatient Medications on File Prior to Visit   Medication Sig Dispense Refill    allopurinoL (ZYLOPRIM) 100 MG tablet Take 200 mg by mouth.      benazepriL (LOTENSIN) 20 MG tablet Take 20 mg by mouth 2 (two) times daily.      ELIQUIS 5 mg Tab Take 5 mg by mouth 2 (two) times daily.      furosemide (LASIX) 40 MG tablet Take 40 mg by mouth daily as needed.      glipiZIDE (GLUCOTROL) 5 MG TR24 Take 5 mg by mouth once daily.      metoprolol succinate (TOPROL-XL) 25 MG 24 hr tablet Take 25 mg by mouth once daily.      MITIGARE 0.6 mg Cap Take by mouth.      pravastatin (PRAVACHOL) 20 MG tablet Take 1 tablet by mouth nightly.      vitamin D (VITAMIN D3) 1000 units Tab Take 1,000 Units by mouth.      [DISCONTINUED] oxyCODONE-acetaminophen (PERCOCET)  mg per tablet Take 1 tablet by mouth every 12 (twelve) hours as needed for Pain. 60 tablet 0    [DISCONTINUED] oxyCODONE-acetaminophen (PERCOCET)  mg per tablet Take 1 tablet by mouth every 12 (twelve) hours as needed for Pain. 60 tablet 0    [DISCONTINUED] oxyCODONE-acetaminophen (PERCOCET)  mg per tablet Take 1 tablet by mouth every 12 (twelve) hours as needed for Pain. 60 tablet 0     No current facility-administered medications on file prior to visit.         Body comp:        Diet Education Discussed:    Breakfast:  omlette, eggs  Lunch:  beans or salad    Dinner:  whatever family chooses as restaurant but mainly meat  Off sodas but on sweet tea    Exercise/Activity Discussed:    Knee pain    Behavior or Diet Goals for this patient:    Continue low carb dieting  Minimize portions  Get into exercise routine      Labs  EKG  UGI -  normal   dietary consult- done  psych consult - pending    Seminar     I will obtain the following clearances prior to surgery: cardiology- pending    : total time spent for visit: 20 minutes

## 2023-02-03 LAB
6MAM UR QL: NOT DETECTED
7AMINOCLONAZEPAM UR QL: NOT DETECTED
A-OH ALPRAZ UR QL: NOT DETECTED
ALPHA-OH-MIDAZOLAM: NOT DETECTED
ALPRAZ UR QL: NOT DETECTED
AMPHET UR QL SCN: NOT DETECTED
ANNOTATION COMMENT IMP: NORMAL
ANNOTATION COMMENT IMP: NORMAL
BARBITURATES UR QL: NOT DETECTED
BUPRENORPHINE UR QL: NOT DETECTED
BZE UR QL: NOT DETECTED
CARBOXYTHC UR QL: NOT DETECTED
CARISOPRODOL UR QL: NOT DETECTED
CLONAZEPAM UR QL: NOT DETECTED
CODEINE UR QL: NOT DETECTED
CREAT UR-MCNC: 156.1 MG/DL (ref 20–400)
DIAZEPAM UR QL: NOT DETECTED
ETHYL GLUCURONIDE UR QL: NOT DETECTED
FENTANYL UR QL: NOT DETECTED
GABAPENTIN: NOT DETECTED
HYDROCODONE UR QL: NOT DETECTED
HYDROMORPHONE UR QL: NOT DETECTED
LORAZEPAM UR QL: NOT DETECTED
MDA UR QL: NOT DETECTED
MDEA UR QL: NOT DETECTED
MDMA UR QL: NOT DETECTED
ME-PHENIDATE UR QL: NOT DETECTED
METHADONE UR QL: NOT DETECTED
METHAMPHET UR QL: NOT DETECTED
MIDAZOLAM UR QL SCN: NOT DETECTED
MORPHINE UR QL: NOT DETECTED
NALOXONE: NOT DETECTED
NORBUPRENORPHINE UR QL CFM: NOT DETECTED
NORDIAZEPAM UR QL: NOT DETECTED
NORFENTANYL UR QL: NOT DETECTED
NORHYDROCODONE UR QL CFM: NOT DETECTED
NORMEPERIDINE UR QL CFM: NOT DETECTED
NOROXYCODONE UR QL CFM: PRESENT
NOROXYMORPHONE UR QL SCN: PRESENT
OXAZEPAM UR QL: NOT DETECTED
OXYCODONE UR QL: PRESENT
OXYMORPHONE UR QL: PRESENT
PATHOLOGY STUDY: NORMAL
PCP UR QL: NOT DETECTED
PHENTERMINE UR QL: NOT DETECTED
PREGABALIN: NOT DETECTED
SERVICE CMNT-IMP: NORMAL
TAPENTADOL UR QL SCN: NOT DETECTED
TAPENTADOL UR QL SCN: NOT DETECTED
TEMAZEPAM UR QL: NOT DETECTED
TRAMADOL UR QL: NOT DETECTED
ZOLPIDEM METABOLITE: NOT DETECTED
ZOLPIDEM UR QL: NOT DETECTED

## 2023-03-15 ENCOUNTER — OFFICE VISIT (OUTPATIENT)
Dept: BARIATRICS | Facility: CLINIC | Age: 68
End: 2023-03-15
Payer: MEDICARE

## 2023-03-15 VITALS
HEIGHT: 71 IN | RESPIRATION RATE: 16 BRPM | WEIGHT: 315 LBS | SYSTOLIC BLOOD PRESSURE: 180 MMHG | TEMPERATURE: 98 F | BODY MASS INDEX: 44.1 KG/M2 | HEART RATE: 66 BPM | DIASTOLIC BLOOD PRESSURE: 121 MMHG

## 2023-03-15 DIAGNOSIS — E78.5 HYPERLIPIDEMIA, UNSPECIFIED HYPERLIPIDEMIA TYPE: ICD-10-CM

## 2023-03-15 DIAGNOSIS — E66.01 MORBID OBESITY WITH BODY MASS INDEX (BMI) GREATER THAN OR EQUAL TO 50: Primary | ICD-10-CM

## 2023-03-15 DIAGNOSIS — G47.33 OSA (OBSTRUCTIVE SLEEP APNEA): ICD-10-CM

## 2023-03-15 DIAGNOSIS — E66.01 MORBID OBESITY: ICD-10-CM

## 2023-03-15 DIAGNOSIS — E11.9 TYPE 2 DIABETES MELLITUS WITHOUT COMPLICATION, WITHOUT LONG-TERM CURRENT USE OF INSULIN: ICD-10-CM

## 2023-03-15 DIAGNOSIS — M17.0 PRIMARY OSTEOARTHRITIS OF BOTH KNEES: ICD-10-CM

## 2023-03-15 DIAGNOSIS — N18.30 STAGE 3 CHRONIC KIDNEY DISEASE, UNSPECIFIED WHETHER STAGE 3A OR 3B CKD: ICD-10-CM

## 2023-03-15 PROCEDURE — 3288F FALL RISK ASSESSMENT DOCD: CPT | Mod: CPTII,S$GLB,, | Performed by: SURGERY

## 2023-03-15 PROCEDURE — 1101F PR PT FALLS ASSESS DOC 0-1 FALLS W/OUT INJ PAST YR: ICD-10-PCS | Mod: CPTII,S$GLB,, | Performed by: SURGERY

## 2023-03-15 PROCEDURE — 3080F PR MOST RECENT DIASTOLIC BLOOD PRESSURE >= 90 MM HG: ICD-10-PCS | Mod: CPTII,S$GLB,, | Performed by: SURGERY

## 2023-03-15 PROCEDURE — 99999 PR PBB SHADOW E&M-EST. PATIENT-LVL IV: ICD-10-PCS | Mod: PBBFAC,,, | Performed by: SURGERY

## 2023-03-15 PROCEDURE — 3080F DIAST BP >= 90 MM HG: CPT | Mod: CPTII,S$GLB,, | Performed by: SURGERY

## 2023-03-15 PROCEDURE — 1101F PT FALLS ASSESS-DOCD LE1/YR: CPT | Mod: CPTII,S$GLB,, | Performed by: SURGERY

## 2023-03-15 PROCEDURE — 3077F SYST BP >= 140 MM HG: CPT | Mod: CPTII,S$GLB,, | Performed by: SURGERY

## 2023-03-15 PROCEDURE — 1125F PR PAIN SEVERITY QUANTIFIED, PAIN PRESENT: ICD-10-PCS | Mod: CPTII,S$GLB,, | Performed by: SURGERY

## 2023-03-15 PROCEDURE — 3288F PR FALLS RISK ASSESSMENT DOCUMENTED: ICD-10-PCS | Mod: CPTII,S$GLB,, | Performed by: SURGERY

## 2023-03-15 PROCEDURE — 99213 OFFICE O/P EST LOW 20 MIN: CPT | Mod: S$GLB,,, | Performed by: SURGERY

## 2023-03-15 PROCEDURE — 99213 PR OFFICE/OUTPT VISIT, EST, LEVL III, 20-29 MIN: ICD-10-PCS | Mod: S$GLB,,, | Performed by: SURGERY

## 2023-03-15 PROCEDURE — 3008F BODY MASS INDEX DOCD: CPT | Mod: CPTII,S$GLB,, | Performed by: SURGERY

## 2023-03-15 PROCEDURE — 1125F AMNT PAIN NOTED PAIN PRSNT: CPT | Mod: CPTII,S$GLB,, | Performed by: SURGERY

## 2023-03-15 PROCEDURE — 1159F PR MEDICATION LIST DOCUMENTED IN MEDICAL RECORD: ICD-10-PCS | Mod: CPTII,S$GLB,, | Performed by: SURGERY

## 2023-03-15 PROCEDURE — 99999 PR PBB SHADOW E&M-EST. PATIENT-LVL IV: CPT | Mod: PBBFAC,,, | Performed by: SURGERY

## 2023-03-15 PROCEDURE — 3008F PR BODY MASS INDEX (BMI) DOCUMENTED: ICD-10-PCS | Mod: CPTII,S$GLB,, | Performed by: SURGERY

## 2023-03-15 PROCEDURE — 3077F PR MOST RECENT SYSTOLIC BLOOD PRESSURE >= 140 MM HG: ICD-10-PCS | Mod: CPTII,S$GLB,, | Performed by: SURGERY

## 2023-03-15 PROCEDURE — 1159F MED LIST DOCD IN RCRD: CPT | Mod: CPTII,S$GLB,, | Performed by: SURGERY

## 2023-03-15 NOTE — PROGRESS NOTES
Medically Supervised Weight Loss Documentation    Date of Visit: 03/15/2023    Patient: Bridgette Barnes    Current Weight: 389  Current BMI: Body mass index is 54.3 kg/m².  Weight Change: - 11 pounds     Last Weight: 394    Beginning Weight: 400      Vitals:   Vitals:    03/15/23 1029   BP: (!) 180/121   Pulse: 66   Resp:    Temp:        Comorbidities:   Past Medical History:   Diagnosis Date    Atrial fibrillation     CKD (chronic kidney disease), stage III     Diabetes mellitus     Gout, unspecified     Hyperlipidemia     Hypertension     Sleep apnea uses CPAP        Medications:  Current Outpatient Medications on File Prior to Visit   Medication Sig Dispense Refill    allopurinoL (ZYLOPRIM) 100 MG tablet Take 200 mg by mouth.      benazepriL (LOTENSIN) 20 MG tablet Take 20 mg by mouth 2 (two) times daily.      ELIQUIS 5 mg Tab Take 5 mg by mouth 2 (two) times daily.      furosemide (LASIX) 40 MG tablet Take 40 mg by mouth daily as needed.      glipiZIDE (GLUCOTROL) 5 MG TR24 Take 5 mg by mouth once daily.      metoprolol succinate (TOPROL-XL) 25 MG 24 hr tablet Take 25 mg by mouth once daily.      MITIGARE 0.6 mg Cap Take by mouth.      oxyCODONE-acetaminophen (PERCOCET)  mg per tablet Take 1 tablet by mouth every 12 (twelve) hours as needed for Pain. 60 tablet 0    pravastatin (PRAVACHOL) 20 MG tablet Take 1 tablet by mouth nightly.      vitamin D (VITAMIN D3) 1000 units Tab Take 1,000 Units by mouth.      oxyCODONE-acetaminophen (PERCOCET)  mg per tablet Take 1 tablet by mouth every 12 (twelve) hours as needed for Pain. (Patient not taking: Reported on 3/15/2023) 60 tablet 0    [START ON 3/31/2023] oxyCODONE-acetaminophen (PERCOCET)  mg per tablet Take 1 tablet by mouth every 12 (twelve) hours as needed for Pain. (Patient not taking: Reported on 3/15/2023) 60 tablet 0     No current facility-administered medications on file prior to visit.         Body comp:  uto    Diet Education  Discussed:    Breakfast:  more like mid morning- stuffed peppers, hu olvin sausage  Lunch:  chicken wings, shrimp with vegetables  Dinner:  trys to skip dinner    Still drinking sweet tea    Exercise/Activity Discussed:    Walking with crutches    Behavior or Diet Goals for this patient:    Plan to add protein shake to diet as meal replacement  Upper body bands or weights- 20 minutes 3 times per week     Blood pressure elevated - re check- suspect some anxiety    Labs  EKG  UGI -  normal   dietary consult- done  psych consult - pending    Seminar     I will obtain the following clearances prior to surgery: cardiology- pending         : total time spent for visit: 20 minutes

## 2023-04-24 ENCOUNTER — TELEPHONE (OUTPATIENT)
Dept: BARIATRICS | Facility: CLINIC | Age: 68
End: 2023-04-24
Payer: MEDICARE

## 2023-04-24 NOTE — TELEPHONE ENCOUNTER
----- Message from Rosa Melchor sent at 4/24/2023 10:57 AM CDT -----  Regarding: advice  Contact: patient  Type: Needs Medical Advice  Who Called:  patient  Symptoms (please be specific):    How long has patient had these symptoms:    Pharmacy name and phone #:    Best Call Back Number: [phone]  Additional Information: Patient's appointment with Psyc on 05/04/23.  Patient has appointment with Dr. Burgos 04/26/23.  Should he wait to see Dr. Burgos until after her sees psyc?  Please call patient to advise.  Thanks!

## 2023-04-24 NOTE — TELEPHONE ENCOUNTER
called pt and got the fax # for the cardiologist and let him know to keep his appt on the 26th with Dr. Burgos. Pt understood

## 2023-04-26 ENCOUNTER — OFFICE VISIT (OUTPATIENT)
Dept: BARIATRICS | Facility: CLINIC | Age: 68
End: 2023-04-26
Payer: MEDICARE

## 2023-04-26 VITALS
HEIGHT: 71 IN | HEART RATE: 43 BPM | DIASTOLIC BLOOD PRESSURE: 83 MMHG | BODY MASS INDEX: 44.1 KG/M2 | TEMPERATURE: 98 F | RESPIRATION RATE: 16 BRPM | WEIGHT: 315 LBS | SYSTOLIC BLOOD PRESSURE: 176 MMHG

## 2023-04-26 DIAGNOSIS — G47.33 OSA (OBSTRUCTIVE SLEEP APNEA): ICD-10-CM

## 2023-04-26 DIAGNOSIS — E66.01 MORBID OBESITY WITH BODY MASS INDEX (BMI) GREATER THAN OR EQUAL TO 50: ICD-10-CM

## 2023-04-26 DIAGNOSIS — E11.9 TYPE 2 DIABETES MELLITUS WITHOUT COMPLICATION, WITHOUT LONG-TERM CURRENT USE OF INSULIN: ICD-10-CM

## 2023-04-26 DIAGNOSIS — E66.01 MORBID OBESITY: Primary | ICD-10-CM

## 2023-04-26 DIAGNOSIS — N18.30 STAGE 3 CHRONIC KIDNEY DISEASE, UNSPECIFIED WHETHER STAGE 3A OR 3B CKD: ICD-10-CM

## 2023-04-26 DIAGNOSIS — M17.0 PRIMARY OSTEOARTHRITIS OF BOTH KNEES: ICD-10-CM

## 2023-04-26 PROCEDURE — 1159F MED LIST DOCD IN RCRD: CPT | Mod: CPTII,S$GLB,, | Performed by: SURGERY

## 2023-04-26 PROCEDURE — 4010F ACE/ARB THERAPY RXD/TAKEN: CPT | Mod: CPTII,S$GLB,, | Performed by: SURGERY

## 2023-04-26 PROCEDURE — 4010F PR ACE/ARB THEARPY RXD/TAKEN: ICD-10-PCS | Mod: CPTII,S$GLB,, | Performed by: SURGERY

## 2023-04-26 PROCEDURE — 3288F FALL RISK ASSESSMENT DOCD: CPT | Mod: CPTII,S$GLB,, | Performed by: SURGERY

## 2023-04-26 PROCEDURE — 3079F PR MOST RECENT DIASTOLIC BLOOD PRESSURE 80-89 MM HG: ICD-10-PCS | Mod: CPTII,S$GLB,, | Performed by: SURGERY

## 2023-04-26 PROCEDURE — 1101F PR PT FALLS ASSESS DOC 0-1 FALLS W/OUT INJ PAST YR: ICD-10-PCS | Mod: CPTII,S$GLB,, | Performed by: SURGERY

## 2023-04-26 PROCEDURE — 1125F AMNT PAIN NOTED PAIN PRSNT: CPT | Mod: CPTII,S$GLB,, | Performed by: SURGERY

## 2023-04-26 PROCEDURE — 99999 PR PBB SHADOW E&M-EST. PATIENT-LVL III: ICD-10-PCS | Mod: PBBFAC,,, | Performed by: SURGERY

## 2023-04-26 PROCEDURE — 3077F PR MOST RECENT SYSTOLIC BLOOD PRESSURE >= 140 MM HG: ICD-10-PCS | Mod: CPTII,S$GLB,, | Performed by: SURGERY

## 2023-04-26 PROCEDURE — 3288F PR FALLS RISK ASSESSMENT DOCUMENTED: ICD-10-PCS | Mod: CPTII,S$GLB,, | Performed by: SURGERY

## 2023-04-26 PROCEDURE — 3079F DIAST BP 80-89 MM HG: CPT | Mod: CPTII,S$GLB,, | Performed by: SURGERY

## 2023-04-26 PROCEDURE — 99213 OFFICE O/P EST LOW 20 MIN: CPT | Mod: S$GLB,,, | Performed by: SURGERY

## 2023-04-26 PROCEDURE — 1101F PT FALLS ASSESS-DOCD LE1/YR: CPT | Mod: CPTII,S$GLB,, | Performed by: SURGERY

## 2023-04-26 PROCEDURE — 99999 PR PBB SHADOW E&M-EST. PATIENT-LVL III: CPT | Mod: PBBFAC,,, | Performed by: SURGERY

## 2023-04-26 PROCEDURE — 99213 PR OFFICE/OUTPT VISIT, EST, LEVL III, 20-29 MIN: ICD-10-PCS | Mod: S$GLB,,, | Performed by: SURGERY

## 2023-04-26 PROCEDURE — 3077F SYST BP >= 140 MM HG: CPT | Mod: CPTII,S$GLB,, | Performed by: SURGERY

## 2023-04-26 PROCEDURE — 3008F BODY MASS INDEX DOCD: CPT | Mod: CPTII,S$GLB,, | Performed by: SURGERY

## 2023-04-26 PROCEDURE — 1125F PR PAIN SEVERITY QUANTIFIED, PAIN PRESENT: ICD-10-PCS | Mod: CPTII,S$GLB,, | Performed by: SURGERY

## 2023-04-26 PROCEDURE — 3008F PR BODY MASS INDEX (BMI) DOCUMENTED: ICD-10-PCS | Mod: CPTII,S$GLB,, | Performed by: SURGERY

## 2023-04-26 PROCEDURE — 1159F PR MEDICATION LIST DOCUMENTED IN MEDICAL RECORD: ICD-10-PCS | Mod: CPTII,S$GLB,, | Performed by: SURGERY

## 2023-04-26 RX ORDER — AMLODIPINE BESYLATE 5 MG/1
TABLET ORAL
COMMUNITY
Start: 2023-03-15

## 2023-04-26 NOTE — PROGRESS NOTES
Medically Supervised Weight Loss Documentation    Date of Visit: 04/26/2023    Patient: Bridgette Barnes    Current Weight: 390  Current BMI: Body mass index is 54.41 kg/m².  Weight Change: -10    Last Weight: 389    Beginning Weight: 400      Vitals:   Vitals:    04/26/23 0905   BP: (!) 176/83   Pulse: (!) 43   Resp: 16   Temp: 97.8 °F (36.6 °C)       Comorbidities:   Past Medical History:   Diagnosis Date    Atrial fibrillation     CKD (chronic kidney disease), stage III     Diabetes mellitus     Gout, unspecified     Hyperlipidemia     Hypertension     Sleep apnea uses CPAP        Medications:  Current Outpatient Medications on File Prior to Visit   Medication Sig Dispense Refill    allopurinoL (ZYLOPRIM) 100 MG tablet Take 200 mg by mouth.      amLODIPine (NORVASC) 5 MG tablet Take by mouth.      benazepriL (LOTENSIN) 20 MG tablet Take 20 mg by mouth 2 (two) times daily.      ELIQUIS 5 mg Tab Take 5 mg by mouth 2 (two) times daily.      furosemide (LASIX) 40 MG tablet Take 40 mg by mouth daily as needed.      glipiZIDE (GLUCOTROL) 5 MG TR24 Take 5 mg by mouth once daily.      metoprolol succinate (TOPROL-XL) 25 MG 24 hr tablet Take 25 mg by mouth once daily.      MITIGARE 0.6 mg Cap Take by mouth.      oxyCODONE-acetaminophen (PERCOCET)  mg per tablet Take 1 tablet by mouth every 12 (twelve) hours as needed for Pain. 60 tablet 0    pravastatin (PRAVACHOL) 20 MG tablet Take 1 tablet by mouth nightly.      vitamin D (VITAMIN D3) 1000 units Tab Take 1,000 Units by mouth.      [DISCONTINUED] oxyCODONE-acetaminophen (PERCOCET)  mg per tablet Take 1 tablet by mouth every 12 (twelve) hours as needed for Pain. (Patient not taking: Reported on 3/15/2023) 60 tablet 0    [DISCONTINUED] oxyCODONE-acetaminophen (PERCOCET)  mg per tablet Take 1 tablet by mouth every 12 (twelve) hours as needed for Pain. (Patient not taking: Reported on 4/26/2023) 60 tablet 0     No current facility-administered medications on  file prior to visit.         Body comp:    uto    Diet Education Discussed:    Breakfast:  skips  Lunch:  jambayla dinner  Dinner:  eats out a restaurant by his house     He has found protein shake from [facility]- pure protein shake    Had some cheats with birthdays and weddings    Slowed down on sweet tea    Exercise/Activity Discussed:    Started doing weights    Behavior or Diet Goals for this patient:    Try to stop sweet tea  Avoid cheats during celebration  Keep exercising      Labs  EKG  UGI -  normal   dietary consult- done  psych consult - May 4  Seminar     I will obtain the following clearances prior to surgery: cardiology- pending    : total time spent for visit: 20 minutes

## 2023-05-01 ENCOUNTER — OFFICE VISIT (OUTPATIENT)
Dept: PAIN MEDICINE | Facility: CLINIC | Age: 68
End: 2023-05-01
Payer: MEDICARE

## 2023-05-01 VITALS
WEIGHT: 315 LBS | SYSTOLIC BLOOD PRESSURE: 153 MMHG | HEIGHT: 71 IN | DIASTOLIC BLOOD PRESSURE: 83 MMHG | BODY MASS INDEX: 44.1 KG/M2 | HEART RATE: 58 BPM

## 2023-05-01 DIAGNOSIS — R53.81 PHYSICAL DECONDITIONING: ICD-10-CM

## 2023-05-01 DIAGNOSIS — M25.562 CHRONIC PAIN OF LEFT KNEE: Primary | ICD-10-CM

## 2023-05-01 DIAGNOSIS — E66.01 MORBID OBESITY WITH BMI OF 50.0-59.9, ADULT: ICD-10-CM

## 2023-05-01 DIAGNOSIS — M17.12 PRIMARY OSTEOARTHRITIS OF LEFT KNEE: ICD-10-CM

## 2023-05-01 DIAGNOSIS — G89.29 CHRONIC PAIN OF LEFT KNEE: Primary | ICD-10-CM

## 2023-05-01 DIAGNOSIS — Z79.891 CHRONIC USE OF OPIATE FOR THERAPEUTIC PURPOSE: ICD-10-CM

## 2023-05-01 PROCEDURE — 80326 AMPHETAMINES 5 OR MORE: CPT

## 2023-05-01 PROCEDURE — 1125F PR PAIN SEVERITY QUANTIFIED, PAIN PRESENT: ICD-10-PCS | Mod: CPTII,S$GLB,,

## 2023-05-01 PROCEDURE — 99214 PR OFFICE/OUTPT VISIT, EST, LEVL IV, 30-39 MIN: ICD-10-PCS | Mod: S$GLB,,,

## 2023-05-01 PROCEDURE — 3079F PR MOST RECENT DIASTOLIC BLOOD PRESSURE 80-89 MM HG: ICD-10-PCS | Mod: CPTII,S$GLB,,

## 2023-05-01 PROCEDURE — 1159F MED LIST DOCD IN RCRD: CPT | Mod: CPTII,S$GLB,,

## 2023-05-01 PROCEDURE — 99999 PR PBB SHADOW E&M-EST. PATIENT-LVL III: CPT | Mod: PBBFAC,,,

## 2023-05-01 PROCEDURE — 3079F DIAST BP 80-89 MM HG: CPT | Mod: CPTII,S$GLB,,

## 2023-05-01 PROCEDURE — 4010F ACE/ARB THERAPY RXD/TAKEN: CPT | Mod: CPTII,S$GLB,,

## 2023-05-01 PROCEDURE — 1101F PT FALLS ASSESS-DOCD LE1/YR: CPT | Mod: CPTII,S$GLB,,

## 2023-05-01 PROCEDURE — 99999 PR PBB SHADOW E&M-EST. PATIENT-LVL III: ICD-10-PCS | Mod: PBBFAC,,,

## 2023-05-01 PROCEDURE — 99214 OFFICE O/P EST MOD 30 MIN: CPT | Mod: S$GLB,,,

## 2023-05-01 PROCEDURE — 3008F BODY MASS INDEX DOCD: CPT | Mod: CPTII,S$GLB,,

## 2023-05-01 PROCEDURE — 3288F PR FALLS RISK ASSESSMENT DOCUMENTED: ICD-10-PCS | Mod: CPTII,S$GLB,,

## 2023-05-01 PROCEDURE — 1125F AMNT PAIN NOTED PAIN PRSNT: CPT | Mod: CPTII,S$GLB,,

## 2023-05-01 PROCEDURE — 3077F PR MOST RECENT SYSTOLIC BLOOD PRESSURE >= 140 MM HG: ICD-10-PCS | Mod: CPTII,S$GLB,,

## 2023-05-01 PROCEDURE — 3008F PR BODY MASS INDEX (BMI) DOCUMENTED: ICD-10-PCS | Mod: CPTII,S$GLB,,

## 2023-05-01 PROCEDURE — 4010F PR ACE/ARB THEARPY RXD/TAKEN: ICD-10-PCS | Mod: CPTII,S$GLB,,

## 2023-05-01 PROCEDURE — 1160F PR REVIEW ALL MEDS BY PRESCRIBER/CLIN PHARMACIST DOCUMENTED: ICD-10-PCS | Mod: CPTII,S$GLB,,

## 2023-05-01 PROCEDURE — 3077F SYST BP >= 140 MM HG: CPT | Mod: CPTII,S$GLB,,

## 2023-05-01 PROCEDURE — 1160F RVW MEDS BY RX/DR IN RCRD: CPT | Mod: CPTII,S$GLB,,

## 2023-05-01 PROCEDURE — 1101F PR PT FALLS ASSESS DOC 0-1 FALLS W/OUT INJ PAST YR: ICD-10-PCS | Mod: CPTII,S$GLB,,

## 2023-05-01 PROCEDURE — 1159F PR MEDICATION LIST DOCUMENTED IN MEDICAL RECORD: ICD-10-PCS | Mod: CPTII,S$GLB,,

## 2023-05-01 PROCEDURE — 3288F FALL RISK ASSESSMENT DOCD: CPT | Mod: CPTII,S$GLB,,

## 2023-05-01 RX ORDER — OXYCODONE AND ACETAMINOPHEN 10; 325 MG/1; MG/1
1 TABLET ORAL EVERY 12 HOURS PRN
Qty: 60 TABLET | Refills: 0 | Status: SHIPPED | OUTPATIENT
Start: 2023-05-30 | End: 2023-07-20 | Stop reason: ALTCHOICE

## 2023-05-01 RX ORDER — OXYCODONE AND ACETAMINOPHEN 10; 325 MG/1; MG/1
1 TABLET ORAL EVERY 12 HOURS PRN
Qty: 60 TABLET | Refills: 0 | Status: SHIPPED | OUTPATIENT
Start: 2023-06-27 | End: 2023-07-20 | Stop reason: SDUPTHER

## 2023-05-01 RX ORDER — OXYCODONE AND ACETAMINOPHEN 10; 325 MG/1; MG/1
1 TABLET ORAL EVERY 12 HOURS PRN
Qty: 60 TABLET | Refills: 0 | Status: SHIPPED | OUTPATIENT
Start: 2023-05-02 | End: 2023-07-20 | Stop reason: ALTCHOICE

## 2023-05-05 LAB
6MAM UR QL: NOT DETECTED
7AMINOCLONAZEPAM UR QL: NOT DETECTED
A-OH ALPRAZ UR QL: NOT DETECTED
ALPHA-OH-MIDAZOLAM: NOT DETECTED
ALPRAZ UR QL: NOT DETECTED
AMPHET UR QL SCN: NOT DETECTED
ANNOTATION COMMENT IMP: NORMAL
ANNOTATION COMMENT IMP: NORMAL
BARBITURATES UR QL: NOT DETECTED
BUPRENORPHINE UR QL: NOT DETECTED
BZE UR QL: NOT DETECTED
CARBOXYTHC UR QL: NOT DETECTED
CARISOPRODOL UR QL: NOT DETECTED
CLONAZEPAM UR QL: NOT DETECTED
CODEINE UR QL: NOT DETECTED
CREAT UR-MCNC: 206.7 MG/DL (ref 20–400)
DIAZEPAM UR QL: NOT DETECTED
ETHYL GLUCURONIDE UR QL: NOT DETECTED
FENTANYL UR QL: NOT DETECTED
GABAPENTIN: NOT DETECTED
HYDROCODONE UR QL: NOT DETECTED
HYDROMORPHONE UR QL: NOT DETECTED
LORAZEPAM UR QL: NOT DETECTED
MDA UR QL: NOT DETECTED
MDEA UR QL: NOT DETECTED
MDMA UR QL: NOT DETECTED
ME-PHENIDATE UR QL: NOT DETECTED
METHADONE UR QL: NOT DETECTED
METHAMPHET UR QL: NOT DETECTED
MIDAZOLAM UR QL SCN: NOT DETECTED
MORPHINE UR QL: NOT DETECTED
NALOXONE: NOT DETECTED
NORBUPRENORPHINE UR QL CFM: NOT DETECTED
NORDIAZEPAM UR QL: NOT DETECTED
NORFENTANYL UR QL: NOT DETECTED
NORHYDROCODONE UR QL CFM: NOT DETECTED
NORMEPERIDINE UR QL CFM: NOT DETECTED
NOROXYCODONE UR QL CFM: PRESENT
NOROXYMORPHONE UR QL SCN: PRESENT
OXAZEPAM UR QL: NOT DETECTED
OXYCODONE UR QL: PRESENT
OXYMORPHONE UR QL: PRESENT
PATHOLOGY STUDY: NORMAL
PCP UR QL: NOT DETECTED
PHENTERMINE UR QL: NOT DETECTED
PREGABALIN: NOT DETECTED
SERVICE CMNT-IMP: NORMAL
TAPENTADOL UR QL SCN: NOT DETECTED
TAPENTADOL UR QL SCN: NOT DETECTED
TEMAZEPAM UR QL: NOT DETECTED
TRAMADOL UR QL: NOT DETECTED
ZOLPIDEM METABOLITE: NOT DETECTED
ZOLPIDEM UR QL: NOT DETECTED

## 2023-05-22 ENCOUNTER — TELEPHONE (OUTPATIENT)
Dept: BARIATRICS | Facility: CLINIC | Age: 68
End: 2023-05-22
Payer: MEDICARE

## 2023-05-22 NOTE — TELEPHONE ENCOUNTER
Called pt to change appointment scheduled on 5/25 at 9:30 am from Dr. Burgos to appointment with dietician instead. Pt agreed.

## 2023-05-25 ENCOUNTER — CLINICAL SUPPORT (OUTPATIENT)
Dept: BARIATRICS | Facility: CLINIC | Age: 68
End: 2023-05-25
Payer: MEDICARE

## 2023-05-25 VITALS — BODY MASS INDEX: 44.1 KG/M2 | HEIGHT: 71 IN | WEIGHT: 315 LBS

## 2023-05-25 DIAGNOSIS — Z71.3 ENCOUNTER FOR DIETARY COUNSELING AND SURVEILLANCE: ICD-10-CM

## 2023-05-25 DIAGNOSIS — E66.01 MORBID OBESITY: ICD-10-CM

## 2023-05-25 DIAGNOSIS — E11.9 TYPE 2 DIABETES MELLITUS WITHOUT COMPLICATION, WITHOUT LONG-TERM CURRENT USE OF INSULIN: ICD-10-CM

## 2023-05-25 PROCEDURE — 99999 PR PBB SHADOW E&M-EST. PATIENT-LVL III: CPT | Mod: PBBFAC,,,

## 2023-05-25 PROCEDURE — 97803 MED NUTRITION INDIV SUBSEQ: CPT | Mod: GZ,S$GLB,,

## 2023-05-25 PROCEDURE — 97803 PR MED NUTR THER, SUBSQ, INDIV, EA 15 MIN: ICD-10-PCS | Mod: GZ,S$GLB,,

## 2023-05-25 PROCEDURE — 99999 PR PBB SHADOW E&M-EST. PATIENT-LVL III: ICD-10-PCS | Mod: PBBFAC,,,

## 2023-05-25 NOTE — PROGRESS NOTES
NUTRITIONAL CONSULT    Referring Physician: Dr. Burgos  Reason for MNT Referral: Initial assessment for sleeve gastrectomy work-up    PAST MEDICAL HISTORY:   68 y.o. male presents with a BMI of Body mass index is 54.89 kg/m²..  Patient presents for a visit for with weight gain over the past month; total weight loss by making numerous dietary and lifestyle changes.    Past Medical History:   Diagnosis Date    Atrial fibrillation     CKD (chronic kidney disease), stage III     Diabetes mellitus     Gout, unspecified     Hyperlipidemia     Hypertension     Sleep apnea uses CPAP        CLINICAL DATA:  68 y.o.-year-old White male.  Height: 5'11  Weight: 393 lbs  IBW: 172lbs  BMI: 54.89    Goal for Bariatric Surgery: to lose weight and to be around for my grandchildren.    CURRENT DIET:  Regular diet.  Diet Recall: Food records are present.    Current diet recall:   Breakfast:  2-3 times a week he eats breakfast of protein shake or scrambled egg.  Midmorning: 160kcal protein bar  Lunch: tuna/chicken/seafood salad with ritz crackers (12)  Midafternoon: 160kcal protein bar  Dinner: beans no rice with cornbread or stirfry with chicken/steak/shrimp or salad with boiled egg, cheese, and dressing    Current Diet:  Meal pattern: irregular.  Protein supplements: 1 sometimes   Snackin snacks / day, protein bar  Vegetables: Likes a variety. Eats 2-3 times per week.  Fruits: Likes a variety. Eats once per week.  Beverages: 6-8 bottles of water and 1/2 and 1/2 tea 2 glasses, 3 cups of coffee per week  Dining out: x3-4d, Weekly. Mostly fast food, restaurants, and take-out.  Cooking at home: Daily. Mostly baked and grilled meat, fish, starchy CHO, and vegetables.    Exercise:  Current exercise: fishing or using resistance bands  Restrictions to exercise: on crutches due to increased knee pain    Vitamins / Minerals / Herbs:   Patient reports he takes 7-8 supplements, but was unable to list them all.  Patient was asked to bring  list to next appointment to be documented.    Food Allergies:   NKFA, no intolerances stated    Social:  Retired.  Alcohol: None.  Smoking: None.    ASSESSMENT:  Patient demonstrates minimal willingness to change lifestyle habits as evidenced by good exercise, including protein drinks, and occasional food prep at home.    Doing poor-fair with working on greatest challenges (irregular meal patterns, portions, snacker/grazer).    Excess calorie intake.  Adequate protein intake.    Patient continues to skip breakfast most days with increased snacking between meals and dining out multiple days a week. He feels it is a challenge to prepare food for one person. RD discussed importance of reducing dining out and improving meal preparation at home.      Patient taking Glipizide without food or a protein only meal.  He reports he experiences no hypoglycemia, but not checking blood sugar consistently.  Concern for future hypoglycemic episodes.    BARIATRIC DIET DISCUSSION:  Discussed diet after surgery and related to patients food record.  Reviewed diet progression before and after surgery.  Stressed importance of exercise and its role in achieving weight loss goals.  Answered all questions.  Reinforced surgery will only help with portion sizes.  It will not help make healthy food choices.  Patient must make choices to reach protein, fluids, and other intake goals as well as take vitamins.  Discussed the rule of 15/15 to treat hypoglycemic episodes.  Pair meals with a protein source and a healthy carbohydrate source like fruit, whole grain, or beans.  Discussed various ways to prepare more foods at home.     RECOMMENDATIONS:  Patient is a potential candidate for bariatric surgery only if he can make and maintain the intended lifestyle changes permanently.  Patient has made minimal changes as of this time so unable to fully assess his motivation to make these changes.    Needs additional visit(s) with RD.    PLAN:  Diet:  Adjust diet plan.    Exercise: Increase.    Behavior Modification: Continue to document food & activity logs daily. Consistently use protein shake for breakfast. Addition of 1 cup of vegetables at lunch and dinner. Decrease sweet tea to 1 glass/day. Reduce dining out with increase meal preparation at home. Continue working on bariatric basics to prepare for surgery.     Weight loss prior to surgery (5-10% TBW): -7 lbs    Return to clinic in one month.    SESSION TIME:  60 minutes

## 2023-06-22 ENCOUNTER — TELEPHONE (OUTPATIENT)
Dept: BARIATRICS | Facility: CLINIC | Age: 68
End: 2023-06-22
Payer: MEDICARE

## 2023-06-22 NOTE — TELEPHONE ENCOUNTER
Called pt to inform him of recipt of cardiac clearance and to schedule his next appointment. No answer, lmom.

## 2023-06-23 ENCOUNTER — TELEPHONE (OUTPATIENT)
Dept: BARIATRICS | Facility: CLINIC | Age: 68
End: 2023-06-23
Payer: MEDICARE

## 2023-06-23 NOTE — TELEPHONE ENCOUNTER
Called pt to schedule f/up appt and to inform him that cardiac clearance has been received. Pt agreed to date and time of appt, location verified.

## 2023-07-06 ENCOUNTER — TELEPHONE (OUTPATIENT)
Dept: SURGERY | Facility: CLINIC | Age: 68
End: 2023-07-06
Payer: MEDICARE

## 2023-07-06 NOTE — TELEPHONE ENCOUNTER
left msg for pt to call back so we can reschedule his appt on 8/3/23  because we are not in [place] in August

## 2023-07-20 ENCOUNTER — OFFICE VISIT (OUTPATIENT)
Dept: PAIN MEDICINE | Facility: CLINIC | Age: 68
End: 2023-07-20
Payer: MEDICARE

## 2023-07-20 VITALS
WEIGHT: 315 LBS | SYSTOLIC BLOOD PRESSURE: 162 MMHG | DIASTOLIC BLOOD PRESSURE: 82 MMHG | HEIGHT: 71 IN | BODY MASS INDEX: 44.1 KG/M2 | HEART RATE: 53 BPM

## 2023-07-20 DIAGNOSIS — M17.12 PRIMARY OSTEOARTHRITIS OF LEFT KNEE: ICD-10-CM

## 2023-07-20 DIAGNOSIS — Z79.891 CHRONIC USE OF OPIATE FOR THERAPEUTIC PURPOSE: ICD-10-CM

## 2023-07-20 DIAGNOSIS — R53.81 PHYSICAL DECONDITIONING: ICD-10-CM

## 2023-07-20 DIAGNOSIS — G89.29 CHRONIC PAIN OF LEFT KNEE: Primary | ICD-10-CM

## 2023-07-20 DIAGNOSIS — E66.01 MORBID OBESITY WITH BMI OF 50.0-59.9, ADULT: ICD-10-CM

## 2023-07-20 DIAGNOSIS — M25.562 CHRONIC PAIN OF LEFT KNEE: Primary | ICD-10-CM

## 2023-07-20 PROCEDURE — 80326 AMPHETAMINES 5 OR MORE: CPT

## 2023-07-20 PROCEDURE — 4010F PR ACE/ARB THEARPY RXD/TAKEN: ICD-10-PCS | Mod: CPTII,S$GLB,,

## 2023-07-20 PROCEDURE — 99999 PR PBB SHADOW E&M-EST. PATIENT-LVL III: ICD-10-PCS | Mod: PBBFAC,,,

## 2023-07-20 PROCEDURE — 3008F PR BODY MASS INDEX (BMI) DOCUMENTED: ICD-10-PCS | Mod: CPTII,S$GLB,,

## 2023-07-20 PROCEDURE — 99214 OFFICE O/P EST MOD 30 MIN: CPT | Mod: S$GLB,,,

## 2023-07-20 PROCEDURE — 1159F PR MEDICATION LIST DOCUMENTED IN MEDICAL RECORD: ICD-10-PCS | Mod: CPTII,S$GLB,,

## 2023-07-20 PROCEDURE — 3079F DIAST BP 80-89 MM HG: CPT | Mod: CPTII,S$GLB,,

## 2023-07-20 PROCEDURE — 1160F RVW MEDS BY RX/DR IN RCRD: CPT | Mod: CPTII,S$GLB,,

## 2023-07-20 PROCEDURE — 1159F MED LIST DOCD IN RCRD: CPT | Mod: CPTII,S$GLB,,

## 2023-07-20 PROCEDURE — 3288F PR FALLS RISK ASSESSMENT DOCUMENTED: ICD-10-PCS | Mod: CPTII,S$GLB,,

## 2023-07-20 PROCEDURE — 3288F FALL RISK ASSESSMENT DOCD: CPT | Mod: CPTII,S$GLB,,

## 2023-07-20 PROCEDURE — 1125F PR PAIN SEVERITY QUANTIFIED, PAIN PRESENT: ICD-10-PCS | Mod: CPTII,S$GLB,,

## 2023-07-20 PROCEDURE — 1101F PR PT FALLS ASSESS DOC 0-1 FALLS W/OUT INJ PAST YR: ICD-10-PCS | Mod: CPTII,S$GLB,,

## 2023-07-20 PROCEDURE — 99999 PR PBB SHADOW E&M-EST. PATIENT-LVL III: CPT | Mod: PBBFAC,,,

## 2023-07-20 PROCEDURE — 1160F PR REVIEW ALL MEDS BY PRESCRIBER/CLIN PHARMACIST DOCUMENTED: ICD-10-PCS | Mod: CPTII,S$GLB,,

## 2023-07-20 PROCEDURE — 99214 PR OFFICE/OUTPT VISIT, EST, LEVL IV, 30-39 MIN: ICD-10-PCS | Mod: S$GLB,,,

## 2023-07-20 PROCEDURE — 3079F PR MOST RECENT DIASTOLIC BLOOD PRESSURE 80-89 MM HG: ICD-10-PCS | Mod: CPTII,S$GLB,,

## 2023-07-20 PROCEDURE — 4010F ACE/ARB THERAPY RXD/TAKEN: CPT | Mod: CPTII,S$GLB,,

## 2023-07-20 PROCEDURE — 1125F AMNT PAIN NOTED PAIN PRSNT: CPT | Mod: CPTII,S$GLB,,

## 2023-07-20 PROCEDURE — 3008F BODY MASS INDEX DOCD: CPT | Mod: CPTII,S$GLB,,

## 2023-07-20 PROCEDURE — 1101F PT FALLS ASSESS-DOCD LE1/YR: CPT | Mod: CPTII,S$GLB,,

## 2023-07-20 PROCEDURE — 3077F SYST BP >= 140 MM HG: CPT | Mod: CPTII,S$GLB,,

## 2023-07-20 PROCEDURE — 3077F PR MOST RECENT SYSTOLIC BLOOD PRESSURE >= 140 MM HG: ICD-10-PCS | Mod: CPTII,S$GLB,,

## 2023-07-20 RX ORDER — OXYCODONE AND ACETAMINOPHEN 10; 325 MG/1; MG/1
1 TABLET ORAL EVERY 12 HOURS PRN
Qty: 60 TABLET | Refills: 0 | Status: SHIPPED | OUTPATIENT
Start: 2023-09-20 | End: 2024-01-11 | Stop reason: ALTCHOICE

## 2023-07-20 RX ORDER — OXYCODONE AND ACETAMINOPHEN 10; 325 MG/1; MG/1
1 TABLET ORAL EVERY 12 HOURS PRN
Qty: 60 TABLET | Refills: 0 | Status: SHIPPED | OUTPATIENT
Start: 2023-07-26 | End: 2023-10-17 | Stop reason: SDUPTHER

## 2023-07-20 RX ORDER — OXYCODONE AND ACETAMINOPHEN 10; 325 MG/1; MG/1
1 TABLET ORAL EVERY 12 HOURS PRN
Qty: 60 TABLET | Refills: 0 | Status: SHIPPED | OUTPATIENT
Start: 2023-08-23 | End: 2024-01-11 | Stop reason: ALTCHOICE

## 2023-07-20 NOTE — PROGRESS NOTES
Referring Physician: Nahum Crawford    PCP: Rosalinda Stockton NP    CC: left knee pain    INTERVAL HPI: Bridgette Barnes is a 67 y.o. male with PMH significant for morbid obesity, HTN, pre-diabetes, and atrial fibrillation on Eliquis presents as an established patient for the management of left knee pain. The patient presents today for medication refill. Today, the patient continues to localize his worse pain to his left knee. The patient localizes his pain to the anterior aspect of his left knee. The patient reports of radiation down his left shin. The patient describes his pain as a sharp type of pain. The patient denies of numbness. The patient reports that his pain is a 9/10.  The patient reports benefit with Percocet 10 but reports he could use an extra dose throughout the day.  In regards to weight loss, the patient is under the care of Dr. Burgos and is currently undergoing surgical clearance to receive a surgical date. Patient has obtained surgical clearance and is awaiting a scheduled date for surgery. The patient reports he plans to under go knee replacement s/p gastric sleeve. The patient denies of any significant changes in his health since his last appointment. The patient also denies of any changes in the character of his pain since his last appointment.   Patient denies of any urinary/fecal incontinence, saddle anesthesia, or weakness.     HPI:   Bridgette Barnes is a 68 y.o. male with PMH significant for morbid obesity, HTN, pre-diabetes, and atrial fibrillation on Eliquis presents for the evaluation of left knee pain. The patient reports that his pain began approximately since high school s/p football injury. The patient denies of prior left knee surgery. The patient reports of worsening severity since onset. The patient localizes his pain to the anterior aspect of his left knee. The patient reports of radiation down his left shin. The patient describes his pain as a sharp type of pain. The  "patient denies of numbness. The patient reports that his pain is a 8-10/10. Patient denies of any urinary/fecal incontinence, saddle anesthesia, or weakness.     Aggravating factors: walking, standing    Mitigating factors: pain somewhat improved after he gets moving; minimal relief with Norco  mg     Relevant Surgeries: no; Dr. Romero at [facility]; was told he had to lose weight prior to arthroplasty      Interventional Therapies: yes; steroid injections with minimal, short lived relief. Tried viscosupplementation without benefit.   01/18/2023: Left knee genicular nerve block-     : Reviewed and consistent with medication use as prescribed.                   Pain Medications:         Currently taking: Percocet 10-325mg po q12 hrs.     Anticoagulation: yes; Eliquis    ROS:  Review of Systems   Constitutional:  Negative for chills and fever.   HENT:  Negative for tinnitus.    Eyes:  Negative for visual disturbance.   Respiratory:  Negative for shortness of breath.    Cardiovascular:  Negative for chest pain.   Gastrointestinal:  Negative for nausea and vomiting.   Genitourinary:  Negative for difficulty urinating.   Musculoskeletal:  Positive for arthralgias and gait problem.   Skin:  Negative for rash.   Allergic/Immunologic: Negative for immunocompromised state.   Neurological:  Negative for syncope and numbness.   Hematological:  Does not bruise/bleed easily.   Psychiatric/Behavioral:  Negative for suicidal ideas.    All other systems reviewed and are negative.     MEDICAL, SURGICAL, FAMILY, SOCIAL HX: reviewed    MEDICATIONS/ALLERGIES: reviewed         Allergies: See med card    Vitals:    07/20/23 1013 07/20/23 1021   BP: (!) 162/82    Pulse: (!) 53    Weight: (!) 178.5 kg (393 lb 8.3 oz)    Height: 5' 11" (1.803 m)    PainSc:   9   8   PainLoc: Knee              Physical exam:  Physical Exam  Vitals and nursing note reviewed.   Constitutional:       Appearance: Normal appearance. He is not " toxic-appearing or diaphoretic.   HENT:      Head: Normocephalic and atraumatic.   Eyes:      General:         Right eye: No discharge.         Left eye: No discharge.      Extraocular Movements: Extraocular movements intact.      Conjunctiva/sclera: Conjunctivae normal.   Cardiovascular:      Rate and Rhythm: Normal rate.   Pulmonary:      Effort: Pulmonary effort is normal. No respiratory distress.      Breath sounds: Normal breath sounds.   Abdominal:      Palpations: Abdomen is soft.   Skin:     General: Skin is warm and dry.      Findings: No rash.   Neurological:      Mental Status: He is alert and oriented to person, place, and time.   Psychiatric:         Mood and Affect: Mood and affect normal.         Cognition and Memory: Memory normal.         Judgment: Judgment normal.        UPPER EXTREMITIES: Normal alignment, normal range of motion, no atrophy, no skin changes,  hair growth and nail growth normal and equal bilaterally. No swelling, no tenderness.    LOWER EXTREMITIES:  Normal alignment, normal range of motion, no atrophy, no skin changes,  hair growth and nail growth normal and equal bilaterally. No swelling, no tenderness.    Knee exam:    Extension/flexion equal bilaterally.   Positive crepitus with in the left knee  No effusion both knees.    Positive joint line tenderness     CRANIAL NERVES:  II:  PERRL bilaterally,   III,IV,VI: EOMI.    V:  Facial sensation equal bilaterally  VII:  Facial motor function normal.  VIII:  Hearing equal to finger rub bilaterally  IX/X: Gag normal, palate symmetric  XI:  Shoulder shrug equal, head turn equal  XII:  Tongue midline without fasciculations      MOTOR: Tone and bulk: normal     MUSCLE STRENGTH:  Hip Flexion: Right 5/5, Left 5/5  Hip Extension: Right 5/5, Left 5/5  Leg Flexion: Right 5/5, Left 5/5  Leg Extension: Right 5/5, Left 5/5  Plantar Flexion: Right 5/5, Left 5/5  Dorsiflexion: Right 5/5, Left  "5/5    Delt Bi Tri     Right: 5/5 5/5 5/5 5/5   Left: 5/5 5/5 5/5 5/5     SENSATION: Light touch and pinprick intact bilaterally  REFLEXES: normal, symmetric, nonbrisk.  Toes down, no clonus. Negative corrales's sign bilaterally.  GAIT: antalgic gait; uses a single point cane for assistance with ambulation       Imaging: per last PCP visit "Last x-ray was in 4/2022 and revealed: Again noted is severe narrowing of the medial joint space with medial lateral marginal osteophytes. Prominent patellofemoral joint space narrowing and osteophytes. No acute fracture or dislocation. No joint effusion. Soft tissues normal."    Assessment: Bridgette Barnes is a 67 y.o. male with PMH significant for morbid obesity, HTN, pre-diabetes, and atrial fibrillation on Eliquis presents as an established patient for the management of left knee pain. The patient presents today for medication refill.   Patient with severe OA of the left knee. Patient reports that he is unable to get a knee arthroplasty given his morbid obesity. Patient continues to localize worse pain to his left knee. The patient reports benefit with Percocet.  Treatment plan outlined below.     Encounter Diagnoses   Name Primary?    Chronic use of opiate for therapeutic purpose     Chronic pain of left knee Yes    Primary osteoarthritis of left knee     Morbid obesity with BMI of 50.0-59.9, adult     Physical deconditioning           Plan:  - I discussed the plan of care with the patient and encouraged progression towards weight loss goals.  Pt verb understanding.   - Refilled Percocet  mg PO q 12 hr PRN for breakthrough pain; # 60 tablets; 2 refills.  reviewed without discrepancies.   - UDS collected today, last had pain medication today.   - I have stressed the importance of physical activity and a home exercise plan to help with chronic pain and improve health.  - Discussed Dr. Galindo's previous med management plan with patient.  (Explained to the patient that " I do not intend to manage his left knee pathology with chronic opioids for the rest of his life as that is not appropriate. I outlined that the patient must make substantial progress in regards to weight loss with progression to left knee arthroplasty within 6 months to 1 year. After which, I will no longer provide the patient with opioids. Patient in agreement.)  - F/U 3 months or sooner if need.   All medication management performed by Dr. Almeida.    Kriss Tan, NP

## 2023-07-25 LAB
6MAM UR QL: NOT DETECTED
7AMINOCLONAZEPAM UR QL: NOT DETECTED
A-OH ALPRAZ UR QL: NOT DETECTED
ALPHA-OH-MIDAZOLAM: NOT DETECTED
ALPRAZ UR QL: NOT DETECTED
AMPHET UR QL SCN: NOT DETECTED
ANNOTATION COMMENT IMP: NORMAL
ANNOTATION COMMENT IMP: NORMAL
BARBITURATES UR QL: NOT DETECTED
BUPRENORPHINE UR QL: NOT DETECTED
BZE UR QL: NOT DETECTED
CARBOXYTHC UR QL: NOT DETECTED
CARISOPRODOL UR QL: NOT DETECTED
CLONAZEPAM UR QL: NOT DETECTED
CODEINE UR QL: NOT DETECTED
CREAT UR-MCNC: 168.2 MG/DL (ref 20–400)
DIAZEPAM UR QL: NOT DETECTED
ETHYL GLUCURONIDE UR QL: NOT DETECTED
FENTANYL UR QL: NOT DETECTED
GABAPENTIN: NOT DETECTED
HYDROCODONE UR QL: NOT DETECTED
HYDROMORPHONE UR QL: NOT DETECTED
LORAZEPAM UR QL: NOT DETECTED
MDA UR QL: NOT DETECTED
MDEA UR QL: NOT DETECTED
MDMA UR QL: NOT DETECTED
ME-PHENIDATE UR QL: NOT DETECTED
METHADONE UR QL: NOT DETECTED
METHAMPHET UR QL: NOT DETECTED
MIDAZOLAM UR QL SCN: NOT DETECTED
MORPHINE UR QL: NOT DETECTED
NALOXONE: NOT DETECTED
NORBUPRENORPHINE UR QL CFM: NOT DETECTED
NORDIAZEPAM UR QL: NOT DETECTED
NORFENTANYL UR QL: NOT DETECTED
NORHYDROCODONE UR QL CFM: NOT DETECTED
NORMEPERIDINE UR QL CFM: NOT DETECTED
NOROXYCODONE UR QL CFM: PRESENT
NOROXYMORPHONE UR QL SCN: PRESENT
OXAZEPAM UR QL: NOT DETECTED
OXYCODONE UR QL: PRESENT
OXYMORPHONE UR QL: PRESENT
PATHOLOGY STUDY: NORMAL
PCP UR QL: NOT DETECTED
PHENTERMINE UR QL: NOT DETECTED
PREGABALIN: NOT DETECTED
SERVICE CMNT-IMP: NORMAL
TAPENTADOL UR QL SCN: NOT DETECTED
TAPENTADOL UR QL SCN: NOT DETECTED
TEMAZEPAM UR QL: NOT DETECTED
TRAMADOL UR QL: NOT DETECTED
ZOLPIDEM METABOLITE: NOT DETECTED
ZOLPIDEM UR QL: NOT DETECTED

## 2023-09-07 ENCOUNTER — OFFICE VISIT (OUTPATIENT)
Dept: SURGERY | Facility: CLINIC | Age: 68
End: 2023-09-07
Payer: MEDICARE

## 2023-09-07 VITALS
RESPIRATION RATE: 16 BRPM | BODY MASS INDEX: 44.1 KG/M2 | SYSTOLIC BLOOD PRESSURE: 135 MMHG | HEIGHT: 71 IN | TEMPERATURE: 98 F | HEART RATE: 53 BPM | DIASTOLIC BLOOD PRESSURE: 68 MMHG | WEIGHT: 315 LBS

## 2023-09-07 DIAGNOSIS — E66.01 MORBID OBESITY WITH BMI OF 50.0-59.9, ADULT: ICD-10-CM

## 2023-09-07 DIAGNOSIS — E66.01 MORBID OBESITY: Primary | ICD-10-CM

## 2023-09-07 DIAGNOSIS — N18.30 STAGE 3 CHRONIC KIDNEY DISEASE, UNSPECIFIED WHETHER STAGE 3A OR 3B CKD: ICD-10-CM

## 2023-09-07 DIAGNOSIS — E11.9 TYPE 2 DIABETES MELLITUS WITHOUT COMPLICATION, WITHOUT LONG-TERM CURRENT USE OF INSULIN: ICD-10-CM

## 2023-09-07 DIAGNOSIS — I10 HYPERTENSION, UNSPECIFIED TYPE: ICD-10-CM

## 2023-09-07 PROCEDURE — 1159F PR MEDICATION LIST DOCUMENTED IN MEDICAL RECORD: ICD-10-PCS | Mod: CPTII,S$GLB,, | Performed by: SURGERY

## 2023-09-07 PROCEDURE — 4010F ACE/ARB THERAPY RXD/TAKEN: CPT | Mod: CPTII,S$GLB,, | Performed by: SURGERY

## 2023-09-07 PROCEDURE — 3078F PR MOST RECENT DIASTOLIC BLOOD PRESSURE < 80 MM HG: ICD-10-PCS | Mod: CPTII,S$GLB,, | Performed by: SURGERY

## 2023-09-07 PROCEDURE — 99999 PR PBB SHADOW E&M-EST. PATIENT-LVL III: CPT | Mod: PBBFAC,,, | Performed by: SURGERY

## 2023-09-07 PROCEDURE — 1125F PR PAIN SEVERITY QUANTIFIED, PAIN PRESENT: ICD-10-PCS | Mod: CPTII,S$GLB,, | Performed by: SURGERY

## 2023-09-07 PROCEDURE — 4010F PR ACE/ARB THEARPY RXD/TAKEN: ICD-10-PCS | Mod: CPTII,S$GLB,, | Performed by: SURGERY

## 2023-09-07 PROCEDURE — 3078F DIAST BP <80 MM HG: CPT | Mod: CPTII,S$GLB,, | Performed by: SURGERY

## 2023-09-07 PROCEDURE — 3288F FALL RISK ASSESSMENT DOCD: CPT | Mod: CPTII,S$GLB,, | Performed by: SURGERY

## 2023-09-07 PROCEDURE — 99999 PR PBB SHADOW E&M-EST. PATIENT-LVL III: ICD-10-PCS | Mod: PBBFAC,,, | Performed by: SURGERY

## 2023-09-07 PROCEDURE — 1101F PT FALLS ASSESS-DOCD LE1/YR: CPT | Mod: CPTII,S$GLB,, | Performed by: SURGERY

## 2023-09-07 PROCEDURE — 1101F PR PT FALLS ASSESS DOC 0-1 FALLS W/OUT INJ PAST YR: ICD-10-PCS | Mod: CPTII,S$GLB,, | Performed by: SURGERY

## 2023-09-07 PROCEDURE — 3075F PR MOST RECENT SYSTOLIC BLOOD PRESS GE 130-139MM HG: ICD-10-PCS | Mod: CPTII,S$GLB,, | Performed by: SURGERY

## 2023-09-07 PROCEDURE — 1159F MED LIST DOCD IN RCRD: CPT | Mod: CPTII,S$GLB,, | Performed by: SURGERY

## 2023-09-07 PROCEDURE — 99213 PR OFFICE/OUTPT VISIT, EST, LEVL III, 20-29 MIN: ICD-10-PCS | Mod: S$GLB,,, | Performed by: SURGERY

## 2023-09-07 PROCEDURE — 3008F BODY MASS INDEX DOCD: CPT | Mod: CPTII,S$GLB,, | Performed by: SURGERY

## 2023-09-07 PROCEDURE — 3288F PR FALLS RISK ASSESSMENT DOCUMENTED: ICD-10-PCS | Mod: CPTII,S$GLB,, | Performed by: SURGERY

## 2023-09-07 PROCEDURE — 1125F AMNT PAIN NOTED PAIN PRSNT: CPT | Mod: CPTII,S$GLB,, | Performed by: SURGERY

## 2023-09-07 PROCEDURE — 3075F SYST BP GE 130 - 139MM HG: CPT | Mod: CPTII,S$GLB,, | Performed by: SURGERY

## 2023-09-07 PROCEDURE — 3008F PR BODY MASS INDEX (BMI) DOCUMENTED: ICD-10-PCS | Mod: CPTII,S$GLB,, | Performed by: SURGERY

## 2023-09-07 PROCEDURE — 99213 OFFICE O/P EST LOW 20 MIN: CPT | Mod: S$GLB,,, | Performed by: SURGERY

## 2023-09-07 NOTE — PROGRESS NOTES
Medically Supervised Weight Loss Documentation    Date of Visit: 09/07/2023    Patient: Bridgette Barnes    Current Weight:393  Current BMI: Body mass index is 54.93 kg/m².  Weight Change:- 7      Last Weight: 390    Beginning Weight: 400      Vitals:   Vitals:    09/07/23 0913   BP: 135/68   Pulse: (!) 53   Resp: 16   Temp: 98.1 °F (36.7 °C)       Comorbidities:   Past Medical History:   Diagnosis Date    Atrial fibrillation     CKD (chronic kidney disease), stage III     Diabetes mellitus     Gout, unspecified     Hyperlipidemia     Hypertension     Sleep apnea uses CPAP        Medications:  Current Outpatient Medications on File Prior to Visit   Medication Sig Dispense Refill    amLODIPine (NORVASC) 5 MG tablet Take by mouth.      benazepriL (LOTENSIN) 20 MG tablet Take 20 mg by mouth 2 (two) times daily.      ELIQUIS 5 mg Tab Take 5 mg by mouth 2 (two) times daily.      furosemide (LASIX) 40 MG tablet Take 40 mg by mouth daily as needed.      glipiZIDE (GLUCOTROL) 5 MG TR24 Take 5 mg by mouth once daily.      metoprolol succinate (TOPROL-XL) 25 MG 24 hr tablet Take 25 mg by mouth once daily.      MITIGARE 0.6 mg Cap Take by mouth.      oxyCODONE-acetaminophen (PERCOCET)  mg per tablet Take 1 tablet by mouth every 12 (twelve) hours as needed for Pain. 60 tablet 0    oxyCODONE-acetaminophen (PERCOCET)  mg per tablet Take 1 tablet by mouth every 12 (twelve) hours as needed for Pain. 60 tablet 0    [START ON 9/20/2023] oxyCODONE-acetaminophen (PERCOCET)  mg per tablet Take 1 tablet by mouth every 12 (twelve) hours as needed for Pain. 60 tablet 0    pravastatin (PRAVACHOL) 20 MG tablet Take 1 tablet by mouth nightly.      vitamin D (VITAMIN D3) 1000 units Tab Take 1,000 Units by mouth.       No current facility-administered medications on file prior to visit.         Body comp:  uto      Diet Education Discussed:    Breakfast:  eggs  Lunch:  skip  Dinner:  cooks with brother- sometimes out to  eat    Doing sweet tea    Exercise/Activity Discussed:    Doing some    Behavior or Diet Goals for this patient:    Do protein shake at lunch  Stop the sweet tea  Goal of 5-10 pounds weight loss   About to go on vacation- stick to plan     Labs  EKG  UGI -  normal   dietary consult- done  psych consult - May 4  Seminar     I will obtain the following clearances prior to surgery: cardiology- pending    : total time spent for visit: 20 minutes

## 2023-09-18 ENCOUNTER — TELEPHONE (OUTPATIENT)
Dept: BARIATRICS | Facility: CLINIC | Age: 68
End: 2023-09-18
Payer: MEDICARE

## 2023-09-18 NOTE — TELEPHONE ENCOUNTER
Called pt to reschedule appointment due to provider schedule change. Pt agreed to new date and time.

## 2023-10-17 ENCOUNTER — OFFICE VISIT (OUTPATIENT)
Dept: PAIN MEDICINE | Facility: CLINIC | Age: 68
End: 2023-10-17
Payer: MEDICARE

## 2023-10-17 VITALS — HEIGHT: 71 IN | BODY MASS INDEX: 44.1 KG/M2 | WEIGHT: 315 LBS

## 2023-10-17 DIAGNOSIS — M25.562 CHRONIC PAIN OF LEFT KNEE: ICD-10-CM

## 2023-10-17 DIAGNOSIS — E66.01 MORBID OBESITY WITH BMI OF 50.0-59.9, ADULT: ICD-10-CM

## 2023-10-17 DIAGNOSIS — Z79.891 CHRONIC USE OF OPIATE FOR THERAPEUTIC PURPOSE: Primary | ICD-10-CM

## 2023-10-17 DIAGNOSIS — G89.29 CHRONIC PAIN OF LEFT KNEE: ICD-10-CM

## 2023-10-17 DIAGNOSIS — M17.12 PRIMARY OSTEOARTHRITIS OF LEFT KNEE: ICD-10-CM

## 2023-10-17 DIAGNOSIS — R53.81 PHYSICAL DECONDITIONING: ICD-10-CM

## 2023-10-17 PROCEDURE — 99214 OFFICE O/P EST MOD 30 MIN: CPT | Mod: S$GLB,,, | Performed by: PHYSICIAN ASSISTANT

## 2023-10-17 PROCEDURE — 80307 DRUG TEST PRSMV CHEM ANLYZR: CPT | Performed by: PHYSICIAN ASSISTANT

## 2023-10-17 PROCEDURE — 3046F HEMOGLOBIN A1C LEVEL >9.0%: CPT | Mod: CPTII,S$GLB,, | Performed by: PHYSICIAN ASSISTANT

## 2023-10-17 PROCEDURE — 99214 PR OFFICE/OUTPT VISIT, EST, LEVL IV, 30-39 MIN: ICD-10-PCS | Mod: S$GLB,,, | Performed by: PHYSICIAN ASSISTANT

## 2023-10-17 PROCEDURE — 4010F ACE/ARB THERAPY RXD/TAKEN: CPT | Mod: CPTII,S$GLB,, | Performed by: PHYSICIAN ASSISTANT

## 2023-10-17 PROCEDURE — 99999 PR PBB SHADOW E&M-EST. PATIENT-LVL III: ICD-10-PCS | Mod: PBBFAC,,, | Performed by: PHYSICIAN ASSISTANT

## 2023-10-17 PROCEDURE — 1159F MED LIST DOCD IN RCRD: CPT | Mod: CPTII,S$GLB,, | Performed by: PHYSICIAN ASSISTANT

## 2023-10-17 PROCEDURE — 3288F FALL RISK ASSESSMENT DOCD: CPT | Mod: CPTII,S$GLB,, | Performed by: PHYSICIAN ASSISTANT

## 2023-10-17 PROCEDURE — 3008F PR BODY MASS INDEX (BMI) DOCUMENTED: ICD-10-PCS | Mod: CPTII,S$GLB,, | Performed by: PHYSICIAN ASSISTANT

## 2023-10-17 PROCEDURE — 4010F PR ACE/ARB THEARPY RXD/TAKEN: ICD-10-PCS | Mod: CPTII,S$GLB,, | Performed by: PHYSICIAN ASSISTANT

## 2023-10-17 PROCEDURE — 1101F PT FALLS ASSESS-DOCD LE1/YR: CPT | Mod: CPTII,S$GLB,, | Performed by: PHYSICIAN ASSISTANT

## 2023-10-17 PROCEDURE — 1159F PR MEDICATION LIST DOCUMENTED IN MEDICAL RECORD: ICD-10-PCS | Mod: CPTII,S$GLB,, | Performed by: PHYSICIAN ASSISTANT

## 2023-10-17 PROCEDURE — 99999 PR PBB SHADOW E&M-EST. PATIENT-LVL III: CPT | Mod: PBBFAC,,, | Performed by: PHYSICIAN ASSISTANT

## 2023-10-17 PROCEDURE — 3046F PR MOST RECENT HEMOGLOBIN A1C LEVEL > 9.0%: ICD-10-PCS | Mod: CPTII,S$GLB,, | Performed by: PHYSICIAN ASSISTANT

## 2023-10-17 PROCEDURE — 80326 AMPHETAMINES 5 OR MORE: CPT | Performed by: PHYSICIAN ASSISTANT

## 2023-10-17 PROCEDURE — 1101F PR PT FALLS ASSESS DOC 0-1 FALLS W/OUT INJ PAST YR: ICD-10-PCS | Mod: CPTII,S$GLB,, | Performed by: PHYSICIAN ASSISTANT

## 2023-10-17 PROCEDURE — 1125F PR PAIN SEVERITY QUANTIFIED, PAIN PRESENT: ICD-10-PCS | Mod: CPTII,S$GLB,, | Performed by: PHYSICIAN ASSISTANT

## 2023-10-17 PROCEDURE — 3008F BODY MASS INDEX DOCD: CPT | Mod: CPTII,S$GLB,, | Performed by: PHYSICIAN ASSISTANT

## 2023-10-17 PROCEDURE — 3288F PR FALLS RISK ASSESSMENT DOCUMENTED: ICD-10-PCS | Mod: CPTII,S$GLB,, | Performed by: PHYSICIAN ASSISTANT

## 2023-10-17 PROCEDURE — 1125F AMNT PAIN NOTED PAIN PRSNT: CPT | Mod: CPTII,S$GLB,, | Performed by: PHYSICIAN ASSISTANT

## 2023-10-17 RX ORDER — OXYCODONE AND ACETAMINOPHEN 10; 325 MG/1; MG/1
1 TABLET ORAL EVERY 12 HOURS PRN
Qty: 60 TABLET | Refills: 0 | Status: SHIPPED | OUTPATIENT
Start: 2023-11-17 | End: 2023-12-16

## 2023-10-17 RX ORDER — OXYCODONE AND ACETAMINOPHEN 10; 325 MG/1; MG/1
1 TABLET ORAL EVERY 12 HOURS PRN
Qty: 60 TABLET | Refills: 0 | Status: SHIPPED | OUTPATIENT
Start: 2023-12-16 | End: 2024-01-11 | Stop reason: ALTCHOICE

## 2023-10-17 RX ORDER — OXYCODONE AND ACETAMINOPHEN 10; 325 MG/1; MG/1
1 TABLET ORAL EVERY 12 HOURS PRN
Qty: 60 TABLET | Refills: 0 | Status: SHIPPED | OUTPATIENT
Start: 2023-10-19 | End: 2023-11-17

## 2023-10-17 NOTE — PROGRESS NOTES
Referring Physician: No ref. provider found    PCP: Rosalinda Stockton NP    CC: left knee pain    INTERVAL HPI: Bridgette Barnes is a 67 y.o. male with PMH significant for morbid obesity, HTN, pre-diabetes, and atrial fibrillation on Eliquis presents as an established patient for the management of left knee pain. The patient presents today for medication refill. Today, the patient continues to localize his worse pain to his left knee. The patient localizes his pain to the anterior aspect of his left knee. The patient reports of radiation down his left shin. The patient describes his pain as a sharp type of pain. The patient denies of numbness. The patient reports that his pain is a 10/10.  The patient reports benefit with Percocet 10 but reports he could use an extra dose throughout the day.  In regards to weight loss, the patient is under the care of Dr. Burgos and is currently undergoing surgical clearance to receive a surgical date. Patient has obtained surgical clearance and is awaiting a scheduled date for surgery. The patient reports he plans to under go knee replacement s/p gastric sleeve. The patient denies of any significant changes in his health since his last appointment. The patient also denies of any changes in the character of his pain since his last appointment.   Patient denies of any urinary/fecal incontinence, saddle anesthesia, or weakness.   Pt has been seen in the clinic before, however pt is new to me.     History below per Dr. Galindo    HPI:   Bridgette Barnes is a 68 y.o. male with PMH significant for morbid obesity, HTN, pre-diabetes, and atrial fibrillation on Eliquis presents for the evaluation of left knee pain. The patient reports that his pain began approximately since high school s/p football injury. The patient denies of prior left knee surgery. The patient reports of worsening severity since onset. The patient localizes his pain to the anterior aspect of his left knee. The patient  "reports of radiation down his left shin. The patient describes his pain as a sharp type of pain. The patient denies of numbness. The patient reports that his pain is a 8-10/10. Patient denies of any urinary/fecal incontinence, saddle anesthesia, or weakness.     Aggravating factors: walking, standing    Mitigating factors: pain somewhat improved after he gets moving; minimal relief with Norco  mg     Relevant Surgeries: no; Dr. Romero at [facility]; was told he had to lose weight prior to arthroplasty      Interventional Therapies: yes; steroid injections with minimal, short lived relief. Tried viscosupplementation without benefit.   01/18/2023: Left knee genicular nerve block-     : Reviewed and consistent with medication use as prescribed.                   Pain Medications:         Currently taking: Percocet 10-325mg po q12 hrs.     Anticoagulation: yes; Eliquis    ROS:  Review of Systems   Constitutional:  Negative for chills and fever.   HENT:  Negative for tinnitus.    Eyes:  Negative for visual disturbance.   Respiratory:  Negative for shortness of breath.    Cardiovascular:  Negative for chest pain.   Gastrointestinal:  Negative for nausea and vomiting.   Genitourinary:  Negative for difficulty urinating.   Musculoskeletal:  Positive for arthralgias and gait problem.   Skin:  Negative for rash.   Allergic/Immunologic: Negative for immunocompromised state.   Neurological:  Negative for syncope and numbness.   Hematological:  Does not bruise/bleed easily.   Psychiatric/Behavioral:  Negative for suicidal ideas.    All other systems reviewed and are negative.       MEDICAL, SURGICAL, FAMILY, SOCIAL HX: reviewed    MEDICATIONS/ALLERGIES: reviewed         Allergies: See med card    Vitals:    10/17/23 0958   Weight: (!) 178.3 kg (393 lb)   Height: 5' 11" (1.803 m)   PainSc: 10-Worst pain ever   PainLoc: Knee       Physical Exam  Vitals and nursing note reviewed.   Constitutional:       " Appearance: Normal appearance. He is not toxic-appearing or diaphoretic.   HENT:      Head: Normocephalic and atraumatic.   Eyes:      General:         Right eye: No discharge.         Left eye: No discharge.      Extraocular Movements: Extraocular movements intact.      Conjunctiva/sclera: Conjunctivae normal.   Cardiovascular:      Rate and Rhythm: Normal rate.   Pulmonary:      Effort: Pulmonary effort is normal. No respiratory distress.      Breath sounds: Normal breath sounds.   Abdominal:      Palpations: Abdomen is soft.   Skin:     General: Skin is warm and dry.      Findings: No rash.   Neurological:      Mental Status: He is alert and oriented to person, place, and time.   Psychiatric:         Mood and Affect: Mood and affect normal.         Cognition and Memory: Memory normal.         Judgment: Judgment normal.          UPPER EXTREMITIES: Normal alignment, normal range of motion, no atrophy, no skin changes,  hair growth and nail growth normal and equal bilaterally. No swelling, no tenderness.    LOWER EXTREMITIES:  Normal alignment, normal range of motion, no atrophy, no skin changes,  hair growth and nail growth normal and equal bilaterally. No swelling, no tenderness.    Knee exam:    Extension/flexion equal bilaterally.   Positive crepitus with in the left knee  No effusion both knees.    Positive joint line tenderness     CRANIAL NERVES:  II:  PERRL bilaterally,   III,IV,VI: EOMI.    V:  Facial sensation equal bilaterally  VII:  Facial motor function normal.  VIII:  Hearing equal to finger rub bilaterally  IX/X: Gag normal, palate symmetric  XI:  Shoulder shrug equal, head turn equal  XII:  Tongue midline without fasciculations      MOTOR: Tone and bulk: normal     MUSCLE STRENGTH:  Hip Flexion: Right 5/5, Left 5/5  Hip Extension: Right 5/5, Left 5/5  Leg Flexion: Right 5/5, Left 5/5  Leg Extension: Right 5/5, Left 5/5  Plantar Flexion: Right 5/5, Left 5/5  Dorsiflexion: Right 5/5, Left  "5/5    Delt Bi Tri     Right: 5/5 5/5 5/5 5/5   Left: 5/5 5/5 5/5 5/5     SENSATION: Light touch and pinprick intact bilaterally  REFLEXES: normal, symmetric, nonbrisk.  Toes down, no clonus. Negative corrales's sign bilaterally.  GAIT: antalgic gait; uses a single point cane for assistance with ambulation       Imaging: per last PCP visit "Last x-ray was in 4/2022 and revealed: Again noted is severe narrowing of the medial joint space with medial lateral marginal osteophytes. Prominent patellofemoral joint space narrowing and osteophytes. No acute fracture or dislocation. No joint effusion. Soft tissues normal."    Assessment: Bridgette Barnes is a 67 y.o. male with PMH significant for morbid obesity, HTN, pre-diabetes, and atrial fibrillation on Eliquis presents as an established patient for the management of left knee pain. The patient presents today for medication refill.   Patient with severe OA of the left knee. Patient reports that he is unable to get a knee arthroplasty given his morbid obesity. Patient continues to localize worse pain to his left knee. The patient reports benefit with Percocet.  Treatment plan outlined below.     Encounter Diagnoses   Name Primary?    Chronic use of opiate for therapeutic purpose Yes    Chronic pain of left knee     Primary osteoarthritis of left knee     Morbid obesity with BMI of 50.0-59.9, adult     Physical deconditioning           Plan:  -I discussed the plan of care with the patient and encouraged progression towards weight loss goals.  Pt verbalized understanding.   - Refilled Percocet  mg PO q 12 hr PRN for breakthrough pain; # 60 tablets; 2 refills.  reviewed without discrepancies.   - I have stressed the importance of physical activity and a home exercise plan to help with chronic pain and improve health.  - Kriss has discussed  Dr. Galindo's previous med management plan with patient.  (Explained to the patient that she does not intend to manage his left " knee pathology with chronic opioids for the rest of his life as that is not appropriate. I outlined that the patient must make substantial progress in regards to weight loss with progression to left knee arthroplasty within 6 months to 1 year. After which,Kriss will no longer provide the patient with opioids. Patient in agreement.)  - F/U 3 months or sooner if need.   All medication management performed by Dr. Almeida.    Felipa Da Silva PA-C

## 2023-10-21 LAB
6MAM UR QL: NOT DETECTED
7AMINOCLONAZEPAM UR QL: NOT DETECTED
A-OH ALPRAZ UR QL: NOT DETECTED
ALPHA-OH-MIDAZOLAM: NOT DETECTED
ALPRAZ UR QL: NOT DETECTED
AMPHET UR QL SCN: NOT DETECTED
ANNOTATION COMMENT IMP: NORMAL
ANNOTATION COMMENT IMP: NORMAL
BARBITURATES UR QL: NOT DETECTED
BUPRENORPHINE UR QL: NOT DETECTED
BZE UR QL: NOT DETECTED
CARBOXYTHC UR QL: NOT DETECTED
CARISOPRODOL UR QL: NOT DETECTED
CLONAZEPAM UR QL: NOT DETECTED
CODEINE UR QL: NOT DETECTED
CREAT UR-MCNC: 78.5 MG/DL (ref 20–400)
DIAZEPAM UR QL: NOT DETECTED
ETHYL GLUCURONIDE UR QL: NOT DETECTED
FENTANYL UR QL: NOT DETECTED
GABAPENTIN: NOT DETECTED
HYDROCODONE UR QL: NOT DETECTED
HYDROMORPHONE UR QL: NOT DETECTED
LORAZEPAM UR QL: NOT DETECTED
MDA UR QL: NOT DETECTED
MDEA UR QL: NOT DETECTED
MDMA UR QL: NOT DETECTED
ME-PHENIDATE UR QL: NOT DETECTED
METHADONE UR QL: NOT DETECTED
METHAMPHET UR QL: NOT DETECTED
MIDAZOLAM UR QL SCN: NOT DETECTED
MORPHINE UR QL: NOT DETECTED
NALOXONE: NOT DETECTED
NORBUPRENORPHINE UR QL CFM: NOT DETECTED
NORDIAZEPAM UR QL: NOT DETECTED
NORFENTANYL UR QL: NOT DETECTED
NORHYDROCODONE UR QL CFM: NOT DETECTED
NORMEPERIDINE UR QL CFM: NOT DETECTED
NOROXYCODONE UR QL CFM: PRESENT
NOROXYMORPHONE UR QL SCN: NOT DETECTED
OXAZEPAM UR QL: NOT DETECTED
OXYCODONE UR QL: PRESENT
OXYMORPHONE UR QL: PRESENT
PATHOLOGY STUDY: NORMAL
PCP UR QL: NOT DETECTED
PHENTERMINE UR QL: NOT DETECTED
PREGABALIN: NOT DETECTED
SERVICE CMNT-IMP: NORMAL
TAPENTADOL UR QL SCN: NOT DETECTED
TAPENTADOL UR QL SCN: NOT DETECTED
TEMAZEPAM UR QL: NOT DETECTED
TRAMADOL UR QL: NOT DETECTED
ZOLPIDEM METABOLITE: NOT DETECTED
ZOLPIDEM UR QL: NOT DETECTED

## 2023-10-31 ENCOUNTER — TELEPHONE (OUTPATIENT)
Dept: SURGERY | Facility: CLINIC | Age: 68
End: 2023-10-31
Payer: MEDICARE

## 2023-10-31 NOTE — TELEPHONE ENCOUNTER
Returned call to pt. Scheduled appt per his request. Pt agreed to date and time, location verified.     ----- Message from Hasmukh Hancock sent at 10/31/2023 11:57 AM CDT -----  Type:  Sooner Appointment Request    Caller is requesting a sooner appointment.  Caller declined first available appointment listed below.  Caller will not accept being placed on the waitlist and is requesting a message be sent to doctor.    Name of Caller:  Patient  When is the first available appointment?  Nothing available in [facility]  Symptoms:  Bariatric FU   Would the patient rather a call back or a response via [facility]? Call  Best Call Back Number:  [phone]  Additional Information:

## 2024-01-11 ENCOUNTER — OFFICE VISIT (OUTPATIENT)
Dept: PAIN MEDICINE | Facility: CLINIC | Age: 69
End: 2024-01-11
Payer: MEDICARE

## 2024-01-11 VITALS — HEART RATE: 76 BPM | BODY MASS INDEX: 44.1 KG/M2 | WEIGHT: 315 LBS | HEIGHT: 71 IN | OXYGEN SATURATION: 97 %

## 2024-01-11 DIAGNOSIS — M17.12 PRIMARY OSTEOARTHRITIS OF LEFT KNEE: ICD-10-CM

## 2024-01-11 DIAGNOSIS — G89.29 CHRONIC PAIN OF LEFT KNEE: Primary | ICD-10-CM

## 2024-01-11 DIAGNOSIS — Z79.891 CHRONIC USE OF OPIATE FOR THERAPEUTIC PURPOSE: ICD-10-CM

## 2024-01-11 DIAGNOSIS — M25.562 CHRONIC PAIN OF LEFT KNEE: Primary | ICD-10-CM

## 2024-01-11 DIAGNOSIS — R53.81 PHYSICAL DECONDITIONING: ICD-10-CM

## 2024-01-11 DIAGNOSIS — E66.01 MORBID OBESITY WITH BMI OF 50.0-59.9, ADULT: ICD-10-CM

## 2024-01-11 PROCEDURE — 80326 AMPHETAMINES 5 OR MORE: CPT

## 2024-01-11 PROCEDURE — 1125F AMNT PAIN NOTED PAIN PRSNT: CPT | Mod: CPTII,S$GLB,,

## 2024-01-11 PROCEDURE — 3288F FALL RISK ASSESSMENT DOCD: CPT | Mod: CPTII,S$GLB,,

## 2024-01-11 PROCEDURE — 80347 BENZODIAZEPINES 13 OR MORE: CPT

## 2024-01-11 PROCEDURE — 80355 GABAPENTIN NON-BLOOD: CPT

## 2024-01-11 PROCEDURE — 3008F BODY MASS INDEX DOCD: CPT | Mod: CPTII,S$GLB,,

## 2024-01-11 PROCEDURE — 1101F PT FALLS ASSESS-DOCD LE1/YR: CPT | Mod: CPTII,S$GLB,,

## 2024-01-11 PROCEDURE — 99999 PR PBB SHADOW E&M-EST. PATIENT-LVL III: CPT | Mod: PBBFAC,,,

## 2024-01-11 PROCEDURE — 1159F MED LIST DOCD IN RCRD: CPT | Mod: CPTII,S$GLB,,

## 2024-01-11 PROCEDURE — 99214 OFFICE O/P EST MOD 30 MIN: CPT | Mod: S$GLB,,,

## 2024-01-11 RX ORDER — OXYCODONE AND ACETAMINOPHEN 10; 325 MG/1; MG/1
1 TABLET ORAL EVERY 12 HOURS PRN
Qty: 60 TABLET | Refills: 0 | Status: SHIPPED | OUTPATIENT
Start: 2024-01-14 | End: 2024-02-12

## 2024-01-11 RX ORDER — OXYCODONE AND ACETAMINOPHEN 10; 325 MG/1; MG/1
1 TABLET ORAL EVERY 12 HOURS PRN
Qty: 60 TABLET | Refills: 0 | Status: SHIPPED | OUTPATIENT
Start: 2024-03-12 | End: 2024-04-10

## 2024-01-11 RX ORDER — OXYCODONE AND ACETAMINOPHEN 10; 325 MG/1; MG/1
1 TABLET ORAL EVERY 12 HOURS PRN
Qty: 60 TABLET | Refills: 0 | Status: SHIPPED | OUTPATIENT
Start: 2024-02-12 | End: 2024-03-12

## 2024-01-11 NOTE — PROGRESS NOTES
Referring Physician: No ref. provider found    PCP: Rosalinda Stockton NP    CC: left knee pain    INTERVAL HPI: Bridgette Barnes is a 67 y.o. male with PMH significant for morbid obesity, HTN, pre-diabetes, and atrial fibrillation on Eliquis presents as an established patient for the management of left knee pain. The patient presents today for medication refill. Today, the patient continues to localize his worse pain to his left knee. The patient localizes his pain to the anterior aspect of his left knee. The patient reports of radiation down his left shin. The patient describes his pain as a sharp type of pain. The patient denies of numbness. The patient reports that his pain is a 9/10.  The patient reports benefit with Percocet 10 but reports he could use an extra dose throughout the day.  In regards to weight loss, the patient is under the care of Dr. Burgos and is currently undergoing surgical clearance to receive a surgical date. Patient has obtained surgical clearance and is awaiting a scheduled date for surgery. The patient had to cancel scheduled surgery secondary to becoming ill. The patient reports he plans to under go knee replacement s/p gastric sleeve. The patient denies of any significant changes in his health since his last appointment. The patient also denies of any changes in the character of his pain since his last appointment.   Patient denies of any urinary/fecal incontinence, saddle anesthesia, or weakness.       History below per Dr. Galindo    HPI:   Bridgette Barnes is a 68 y.o. male with PMH significant for morbid obesity, HTN, pre-diabetes, and atrial fibrillation on Eliquis presents for the evaluation of left knee pain. The patient reports that his pain began approximately since high school s/p football injury. The patient denies of prior left knee surgery. The patient reports of worsening severity since onset. The patient localizes his pain to the anterior aspect of his left knee. The  "patient reports of radiation down his left shin. The patient describes his pain as a sharp type of pain. The patient denies of numbness. The patient reports that his pain is a 8-10/10. Patient denies of any urinary/fecal incontinence, saddle anesthesia, or weakness.     Aggravating factors: walking, standing    Mitigating factors: pain somewhat improved after he gets moving; minimal relief with Norco  mg     Relevant Surgeries: no; Dr. Romero at [facility]; was told he had to lose weight prior to arthroplasty      Interventional Therapies: yes; steroid injections with minimal, short lived relief. Tried viscosupplementation without benefit.   01/18/2023: Left knee genicular nerve block-     : Reviewed and consistent with medication use as prescribed.                   Pain Medications:         Currently taking: Percocet 10-325mg po q12 hrs.     Anticoagulation: yes; Eliquis    ROS:  Review of Systems   Constitutional:  Negative for chills and fever.   HENT:  Negative for tinnitus.    Eyes:  Negative for visual disturbance.   Respiratory:  Negative for shortness of breath.    Cardiovascular:  Negative for chest pain.   Gastrointestinal:  Negative for nausea and vomiting.   Genitourinary:  Negative for difficulty urinating.   Musculoskeletal:  Positive for arthralgias and gait problem.   Skin:  Negative for rash.   Allergic/Immunologic: Negative for immunocompromised state.   Neurological:  Negative for syncope and numbness.   Hematological:  Does not bruise/bleed easily.   Psychiatric/Behavioral:  Negative for suicidal ideas.    All other systems reviewed and are negative.       MEDICAL, SURGICAL, FAMILY, SOCIAL HX: reviewed    MEDICATIONS/ALLERGIES: reviewed         Allergies: See med card    Vitals:    01/11/24 1019   Pulse: 76   SpO2: 97%   Weight: (!) 178.3 kg (393 lb)   Height: 5' 11" (1.803 m)   PainSc: 10-Worst pain ever       Physical Exam  Vitals and nursing note reviewed. "   Constitutional:       Appearance: Normal appearance. He is not toxic-appearing or diaphoretic.   HENT:      Head: Normocephalic and atraumatic.   Eyes:      General:         Right eye: No discharge.         Left eye: No discharge.      Extraocular Movements: Extraocular movements intact.      Conjunctiva/sclera: Conjunctivae normal.   Cardiovascular:      Rate and Rhythm: Normal rate.   Pulmonary:      Effort: Pulmonary effort is normal. No respiratory distress.      Breath sounds: Normal breath sounds.   Abdominal:      Palpations: Abdomen is soft.   Skin:     General: Skin is warm and dry.      Findings: No rash.   Neurological:      Mental Status: He is alert and oriented to person, place, and time.   Psychiatric:         Mood and Affect: Mood and affect normal.         Cognition and Memory: Memory normal.         Judgment: Judgment normal.          UPPER EXTREMITIES: Normal alignment, normal range of motion, no atrophy, no skin changes,  hair growth and nail growth normal and equal bilaterally. No swelling, no tenderness.    LOWER EXTREMITIES:  Normal alignment, normal range of motion, no atrophy, no skin changes,  hair growth and nail growth normal and equal bilaterally. No swelling, no tenderness.    Knee exam:    Extension/flexion equal bilaterally.   Positive crepitus with in the left knee  No effusion both knees.    Positive joint line tenderness     CRANIAL NERVES:  II:  PERRL bilaterally,   III,IV,VI: EOMI.    V:  Facial sensation equal bilaterally  VII:  Facial motor function normal.  VIII:  Hearing equal to finger rub bilaterally  IX/X: Gag normal, palate symmetric  XI:  Shoulder shrug equal, head turn equal  XII:  Tongue midline without fasciculations      MOTOR: Tone and bulk: normal     MUSCLE STRENGTH:  Hip Flexion: Right 5/5, Left 5/5  Hip Extension: Right 5/5, Left 5/5  Leg Flexion: Right 5/5, Left 5/5  Leg Extension: Right 5/5, Left 5/5  Plantar Flexion: Right 5/5, Left 5/5  Dorsiflexion: Right  "5/5, Left 5/5    Delt Bi Tri     Right: 5/5 5/5 5/5 5/5   Left: 5/5 5/5 5/5 5/5     SENSATION: Light touch and pinprick intact bilaterally  REFLEXES: normal, symmetric, nonbrisk.  Toes down, no clonus. Negative corrales's sign bilaterally.  GAIT: antalgic gait; uses a single point cane for assistance with ambulation       Imaging: per last PCP visit "Last x-ray was in 4/2022 and revealed: Again noted is severe narrowing of the medial joint space with medial lateral marginal osteophytes. Prominent patellofemoral joint space narrowing and osteophytes. No acute fracture or dislocation. No joint effusion. Soft tissues normal."    Assessment: Bridgette Barnes is a 67 y.o. male with PMH significant for morbid obesity, HTN, pre-diabetes, and atrial fibrillation on Eliquis presents as an established patient for the management of left knee pain. The patient presents today for medication refill.   Patient with severe OA of the left knee. Patient reports that he is unable to get a knee arthroplasty given his morbid obesity. Patient continues to localize worse pain to his left knee. The patient reports benefit with Percocet.  Treatment plan outlined below.     Encounter Diagnoses   Name Primary?    Chronic use of opiate for therapeutic purpose     Morbid obesity with BMI of 50.0-59.9, adult     Chronic pain of left knee Yes    Primary osteoarthritis of left knee           Plan:  -I discussed the plan of care with the patient and encouraged progression towards weight loss goals.  Pt verbalized understanding.   - Refilled Percocet  mg PO q 12 hr PRN for breakthrough pain; # 60 tablets; 2 refills.  reviewed without discrepancies.   - I have stressed the importance of physical activity and a home exercise plan to help with chronic pain and improve health.  - discussed previous pain management plan with the patient. I advised the patient this would be the last 3 month refill as we are now past the 1 year agreed plan.    - " F/U 3 months or sooner if need.   All medication management performed by Dr. Almeida.    Kriss Tan, NP

## 2024-01-16 LAB
6MAM UR QL: NOT DETECTED
7AMINOCLONAZEPAM UR QL: NOT DETECTED
A-OH ALPRAZ UR QL: NOT DETECTED
ALPHA-OH-MIDAZOLAM: NOT DETECTED
ALPRAZ UR QL: NOT DETECTED
AMPHET UR QL SCN: NOT DETECTED
ANNOTATION COMMENT IMP: NORMAL
BARBITURATES UR QL: NEGATIVE
BUPRENORPHINE UR QL: NOT DETECTED
BZE UR QL: NEGATIVE
CARBOXYTHC UR QL: NEGATIVE
CARISOPRODOL UR QL: NEGATIVE
CLONAZEPAM UR QL: NOT DETECTED
CODEINE UR QL: NOT DETECTED
CREAT UR-MCNC: 120.9 MG/DL (ref 20–400)
DIAZEPAM UR QL: NOT DETECTED
ETHYL GLUCURONIDE UR QL: NEGATIVE
FENTANYL UR QL: NOT DETECTED
GABAPENTIN: NOT DETECTED
HYDROCODONE UR QL: NOT DETECTED
HYDROMORPHONE UR QL: NOT DETECTED
LORAZEPAM UR QL: NOT DETECTED
MDA UR QL: NOT DETECTED
MDEA UR QL: NOT DETECTED
MDMA UR QL: NOT DETECTED
ME-PHENIDATE UR QL: NOT DETECTED
METHADONE UR QL: NEGATIVE
METHAMPHET UR QL: NOT DETECTED
MIDAZOLAM UR QL SCN: NOT DETECTED
MORPHINE UR QL: NOT DETECTED
NALOXONE: NOT DETECTED
NORBUPRENORPHINE UR QL CFM: NOT DETECTED
NORDIAZEPAM UR QL: NOT DETECTED
NORFENTANYL UR QL: NOT DETECTED
NORHYDROCODONE UR QL CFM: NOT DETECTED
NORMEPERIDINE UR QL CFM: NOT DETECTED
NOROXYCODONE UR QL CFM: PRESENT
NOROXYMORPHONE UR QL SCN: PRESENT
OXAZEPAM UR QL: NOT DETECTED
OXYCODONE UR QL: PRESENT
OXYMORPHONE UR QL: PRESENT
PATHOLOGY STUDY: NORMAL
PCP UR QL: NEGATIVE
PHENTERMINE UR QL: NOT DETECTED
PREGABALIN: NOT DETECTED
SERVICE CMNT-IMP: NORMAL
TAPENTADOL UR QL SCN: NOT DETECTED
TAPENTADOL UR QL SCN: NOT DETECTED
TEMAZEPAM UR QL: NOT DETECTED
TRAMADOL UR QL: NEGATIVE
ZOLPIDEM METABOLITE: NOT DETECTED
ZOLPIDEM UR QL: NOT DETECTED

## 2024-04-11 ENCOUNTER — OFFICE VISIT (OUTPATIENT)
Dept: PAIN MEDICINE | Facility: CLINIC | Age: 69
End: 2024-04-11
Payer: MEDICARE

## 2024-04-11 VITALS — HEIGHT: 71 IN | HEART RATE: 60 BPM | OXYGEN SATURATION: 95 % | BODY MASS INDEX: 44.1 KG/M2 | WEIGHT: 315 LBS

## 2024-04-11 DIAGNOSIS — M25.562 CHRONIC PAIN OF LEFT KNEE: Primary | ICD-10-CM

## 2024-04-11 DIAGNOSIS — R53.81 PHYSICAL DECONDITIONING: ICD-10-CM

## 2024-04-11 DIAGNOSIS — M17.12 PRIMARY OSTEOARTHRITIS OF LEFT KNEE: Primary | ICD-10-CM

## 2024-04-11 DIAGNOSIS — E66.01 MORBID OBESITY WITH BMI OF 50.0-59.9, ADULT: ICD-10-CM

## 2024-04-11 DIAGNOSIS — G89.29 CHRONIC PAIN OF LEFT KNEE: ICD-10-CM

## 2024-04-11 DIAGNOSIS — G89.29 CHRONIC PAIN OF LEFT KNEE: Primary | ICD-10-CM

## 2024-04-11 DIAGNOSIS — M25.562 CHRONIC PAIN OF LEFT KNEE: ICD-10-CM

## 2024-04-11 DIAGNOSIS — M17.12 PRIMARY OSTEOARTHRITIS OF LEFT KNEE: ICD-10-CM

## 2024-04-11 PROCEDURE — 4010F ACE/ARB THERAPY RXD/TAKEN: CPT | Mod: CPTII,S$GLB,,

## 2024-04-11 PROCEDURE — 1125F AMNT PAIN NOTED PAIN PRSNT: CPT | Mod: CPTII,S$GLB,,

## 2024-04-11 PROCEDURE — 99214 OFFICE O/P EST MOD 30 MIN: CPT | Mod: S$GLB,,,

## 2024-04-11 PROCEDURE — 1101F PT FALLS ASSESS-DOCD LE1/YR: CPT | Mod: CPTII,S$GLB,,

## 2024-04-11 PROCEDURE — 1160F RVW MEDS BY RX/DR IN RCRD: CPT | Mod: CPTII,S$GLB,,

## 2024-04-11 PROCEDURE — 1159F MED LIST DOCD IN RCRD: CPT | Mod: CPTII,S$GLB,,

## 2024-04-11 PROCEDURE — 3046F HEMOGLOBIN A1C LEVEL >9.0%: CPT | Mod: CPTII,S$GLB,,

## 2024-04-11 PROCEDURE — 3008F BODY MASS INDEX DOCD: CPT | Mod: CPTII,S$GLB,,

## 2024-04-11 PROCEDURE — 3288F FALL RISK ASSESSMENT DOCD: CPT | Mod: CPTII,S$GLB,,

## 2024-04-11 PROCEDURE — 99999 PR PBB SHADOW E&M-EST. PATIENT-LVL III: CPT | Mod: PBBFAC,,,

## 2024-04-11 RX ORDER — OXYCODONE AND ACETAMINOPHEN 10; 325 MG/1; MG/1
1 TABLET ORAL EVERY 12 HOURS PRN
Qty: 60 TABLET | Refills: 0 | Status: SHIPPED | OUTPATIENT
Start: 2024-06-08 | End: 2024-07-07

## 2024-04-11 RX ORDER — OXYCODONE AND ACETAMINOPHEN 10; 325 MG/1; MG/1
1 TABLET ORAL EVERY 12 HOURS PRN
Qty: 60 TABLET | Refills: 0 | Status: SHIPPED | OUTPATIENT
Start: 2024-04-11 | End: 2024-05-10

## 2024-04-11 RX ORDER — OXYCODONE AND ACETAMINOPHEN 10; 325 MG/1; MG/1
1 TABLET ORAL EVERY 12 HOURS PRN
Qty: 60 TABLET | Refills: 0 | Status: SHIPPED | OUTPATIENT
Start: 2024-05-10 | End: 2024-06-08

## 2024-04-11 NOTE — PROGRESS NOTES
Referring Physician: No ref. provider found    PCP: Rosalinda Stockton NP    CC: left knee pain    INTERVAL HPI: Bridgette Barnes is a 67 y.o. male with PMH significant for morbid obesity, HTN, pre-diabetes, and atrial fibrillation on Eliquis presents as an established patient for the management of left knee pain. The patient presents today for medication refill. Today, the patient continues to localize his worse pain to his left knee. The patient localizes his pain to the anterior aspect of his left knee. The patient reports of radiation down his left shin. The patient describes his pain as a sharp type of pain. The patient denies of numbness. The patient reports that his pain is a 9/10.  The patient reports benefit with Percocet 10 but reports he could use an extra dose throughout the day.  In regards to weight loss, the patient is under the care of Dr. Burgos and is currently undergoing surgical clearance to receive a surgical date. Patient reports increase in blood glucose level and has to maintain a healthy HGBa1c to proceed with surgery.  Patient has not been evaluated by Dr. Burgos since September. The patient reports he is advised to f/u after his PCP clears his blood glucose levels. The patient reports he plans to under go knee replacement s/p gastric sleeve. The patient denies of any significant changes in his health since his last appointment. The patient also denies of any changes in the character of his pain since his last appointment.   Patient denies of any urinary/fecal incontinence, saddle anesthesia, or weakness.       History below per Dr. Galindo    HPI:   Bridgette Barnes is a 69 y.o. male with PMH significant for morbid obesity, HTN, pre-diabetes, and atrial fibrillation on Eliquis presents for the evaluation of left knee pain. The patient reports that his pain began approximately since high school s/p football injury. The patient denies of prior left knee surgery. The patient reports of  worsening severity since onset. The patient localizes his pain to the anterior aspect of his left knee. The patient reports of radiation down his left shin. The patient describes his pain as a sharp type of pain. The patient denies of numbness. The patient reports that his pain is a 8-10/10. Patient denies of any urinary/fecal incontinence, saddle anesthesia, or weakness.     Aggravating factors: walking, standing    Mitigating factors: pain somewhat improved after he gets moving; minimal relief with Norco  mg     Relevant Surgeries: no; Dr. Romero at [facility]; was told he had to lose weight prior to arthroplasty      Interventional Therapies: yes; steroid injections with minimal, short lived relief. Tried viscosupplementation without benefit.   01/18/2023: Left knee genicular nerve block-     : Reviewed and consistent with medication use as prescribed.                   Pain Medications:         Currently taking: Percocet 10-325mg po q12 hrs.     Anticoagulation: yes; Eliquis    ROS:  Review of Systems   Constitutional:  Negative for chills and fever.   HENT:  Negative for tinnitus.    Eyes:  Negative for visual disturbance.   Respiratory:  Negative for shortness of breath.    Cardiovascular:  Negative for chest pain.   Gastrointestinal:  Negative for nausea and vomiting.   Genitourinary:  Negative for difficulty urinating.   Musculoskeletal:  Positive for arthralgias and gait problem.   Skin:  Negative for rash.   Allergic/Immunologic: Negative for immunocompromised state.   Neurological:  Negative for syncope and numbness.   Hematological:  Does not bruise/bleed easily.   Psychiatric/Behavioral:  Negative for suicidal ideas.    All other systems reviewed and are negative.       MEDICAL, SURGICAL, FAMILY, SOCIAL HX: reviewed    MEDICATIONS/ALLERGIES: reviewed         Allergies: See med card    Vitals:    04/11/24 0959   Pulse: 60   SpO2: 95%   Weight: (!) 178.3 kg (393 lb 1.3 oz)   Height:  "5' 11" (1.803 m)   PainSc:   9       Physical Exam  Vitals and nursing note reviewed.   Constitutional:       Appearance: Normal appearance. He is not toxic-appearing or diaphoretic.   HENT:      Head: Normocephalic and atraumatic.   Eyes:      General:         Right eye: No discharge.         Left eye: No discharge.      Extraocular Movements: Extraocular movements intact.      Conjunctiva/sclera: Conjunctivae normal.   Cardiovascular:      Rate and Rhythm: Normal rate.   Pulmonary:      Effort: Pulmonary effort is normal. No respiratory distress.      Breath sounds: Normal breath sounds.   Abdominal:      Palpations: Abdomen is soft.   Skin:     General: Skin is warm and dry.      Findings: No rash.   Neurological:      Mental Status: He is alert and oriented to person, place, and time.   Psychiatric:         Mood and Affect: Mood and affect normal.         Cognition and Memory: Memory normal.         Judgment: Judgment normal.          UPPER EXTREMITIES: Normal alignment, normal range of motion, no atrophy, no skin changes,  hair growth and nail growth normal and equal bilaterally. No swelling, no tenderness.    LOWER EXTREMITIES:  Normal alignment, normal range of motion, no atrophy, no skin changes,  hair growth and nail growth normal and equal bilaterally. No swelling, no tenderness.    Knee exam:    Extension/flexion equal bilaterally.   Positive crepitus with in the left knee  No effusion both knees.    Positive joint line tenderness     CRANIAL NERVES:  II:  PERRL bilaterally,   III,IV,VI: EOMI.    V:  Facial sensation equal bilaterally  VII:  Facial motor function normal.  VIII:  Hearing equal to finger rub bilaterally  IX/X: Gag normal, palate symmetric  XI:  Shoulder shrug equal, head turn equal  XII:  Tongue midline without fasciculations      MOTOR: Tone and bulk: normal     MUSCLE STRENGTH:  Hip Flexion: Right 5/5, Left 5/5  Hip Extension: Right 5/5, Left 5/5  Leg Flexion: Right 5/5, Left 5/5  Leg " "Extension: Right 5/5, Left 5/5  Plantar Flexion: Right 5/5, Left 5/5  Dorsiflexion: Right 5/5, Left 5/5    Delt Bi Tri     Right: 5/5 5/5 5/5 5/5   Left: 5/5 5/5 5/5 5/5     SENSATION: Light touch and pinprick intact bilaterally  REFLEXES: normal, symmetric, nonbrisk.  Toes down, no clonus. Negative corrales's sign bilaterally.  GAIT: antalgic gait; uses a single point cane for assistance with ambulation       Imaging: per last PCP visit "Last x-ray was in 4/2022 and revealed: Again noted is severe narrowing of the medial joint space with medial lateral marginal osteophytes. Prominent patellofemoral joint space narrowing and osteophytes. No acute fracture or dislocation. No joint effusion. Soft tissues normal."    Assessment: Bridgette Barnes is a 67 y.o. male with PMH significant for morbid obesity, HTN, pre-diabetes, and atrial fibrillation on Eliquis presents as an established patient for the management of left knee pain. The patient presents today for medication refill.   Patient with severe OA of the left knee. Patient reports that he is unable to get a knee arthroplasty given his morbid obesity. Patient continues to localize worse pain to his left knee. The patient reports benefit with Percocet.  Treatment plan outlined below.     Encounter Diagnoses   Name Primary?    Morbid obesity with BMI of 50.0-59.9, adult Yes    Chronic pain of left knee     Primary osteoarthritis of left knee     Physical deconditioning             Plan:  -I discussed the plan of care with the patient and encouraged progression towards weight loss goals.  Pt verbalized understanding.   - Refilled Percocet  mg PO q 12 hr PRN for breakthrough pain; # 60 tablets; 2 refills.  reviewed without discrepancies.   - Prior UDS reviewed and consistent with meds prescribed.   - I have stressed the importance of physical activity and a home exercise plan to help with chronic pain and improve health.  - discussed previous pain management " plan with the patient. I discussed the previous pain management plan with the patient. I advised the patient we are out of his 1 year agreement. I advised the patient that he needed to begin to show initiative to reach weight loss goals to proceed with surgery or that we would not continued pain medication as it is not the appropriate management for his knee pain.    - F/U 3 months or sooner if needed with  Dr. Almeida  All medication management performed by Dr. Almeida.    Kriss Tan, NP

## 2024-07-08 ENCOUNTER — OFFICE VISIT (OUTPATIENT)
Dept: PAIN MEDICINE | Facility: CLINIC | Age: 69
End: 2024-07-08
Payer: MEDICARE

## 2024-07-08 VITALS
SYSTOLIC BLOOD PRESSURE: 128 MMHG | HEIGHT: 71 IN | BODY MASS INDEX: 44.1 KG/M2 | HEART RATE: 50 BPM | WEIGHT: 315 LBS | DIASTOLIC BLOOD PRESSURE: 71 MMHG

## 2024-07-08 DIAGNOSIS — M17.12 PRIMARY OSTEOARTHRITIS OF LEFT KNEE: ICD-10-CM

## 2024-07-08 DIAGNOSIS — M25.562 CHRONIC PAIN OF LEFT KNEE: Primary | ICD-10-CM

## 2024-07-08 DIAGNOSIS — R53.81 PHYSICAL DECONDITIONING: ICD-10-CM

## 2024-07-08 DIAGNOSIS — G89.29 CHRONIC PAIN OF LEFT KNEE: Primary | ICD-10-CM

## 2024-07-08 DIAGNOSIS — E66.01 MORBID OBESITY WITH BMI OF 50.0-59.9, ADULT: ICD-10-CM

## 2024-07-08 PROCEDURE — 3008F BODY MASS INDEX DOCD: CPT | Mod: CPTII,S$GLB,, | Performed by: ANESTHESIOLOGY

## 2024-07-08 PROCEDURE — 3288F FALL RISK ASSESSMENT DOCD: CPT | Mod: CPTII,S$GLB,, | Performed by: ANESTHESIOLOGY

## 2024-07-08 PROCEDURE — 1101F PT FALLS ASSESS-DOCD LE1/YR: CPT | Mod: CPTII,S$GLB,, | Performed by: ANESTHESIOLOGY

## 2024-07-08 PROCEDURE — 3074F SYST BP LT 130 MM HG: CPT | Mod: CPTII,S$GLB,, | Performed by: ANESTHESIOLOGY

## 2024-07-08 PROCEDURE — 4010F ACE/ARB THERAPY RXD/TAKEN: CPT | Mod: CPTII,S$GLB,, | Performed by: ANESTHESIOLOGY

## 2024-07-08 PROCEDURE — 1159F MED LIST DOCD IN RCRD: CPT | Mod: CPTII,S$GLB,, | Performed by: ANESTHESIOLOGY

## 2024-07-08 PROCEDURE — 3078F DIAST BP <80 MM HG: CPT | Mod: CPTII,S$GLB,, | Performed by: ANESTHESIOLOGY

## 2024-07-08 PROCEDURE — 1125F AMNT PAIN NOTED PAIN PRSNT: CPT | Mod: CPTII,S$GLB,, | Performed by: ANESTHESIOLOGY

## 2024-07-08 PROCEDURE — 99999 PR PBB SHADOW E&M-EST. PATIENT-LVL III: CPT | Mod: PBBFAC,,, | Performed by: ANESTHESIOLOGY

## 2024-07-08 PROCEDURE — 99214 OFFICE O/P EST MOD 30 MIN: CPT | Mod: S$GLB,,, | Performed by: ANESTHESIOLOGY

## 2024-07-08 PROCEDURE — 3046F HEMOGLOBIN A1C LEVEL >9.0%: CPT | Mod: CPTII,S$GLB,, | Performed by: ANESTHESIOLOGY

## 2024-07-08 RX ORDER — OXYCODONE AND ACETAMINOPHEN 10; 325 MG/1; MG/1
1 TABLET ORAL EVERY 12 HOURS PRN
Qty: 60 TABLET | Refills: 0 | Status: SHIPPED | OUTPATIENT
Start: 2024-09-04 | End: 2024-10-03

## 2024-07-08 RX ORDER — OXYCODONE AND ACETAMINOPHEN 10; 325 MG/1; MG/1
1 TABLET ORAL EVERY 12 HOURS PRN
Qty: 60 TABLET | Refills: 0 | Status: SHIPPED | OUTPATIENT
Start: 2024-08-06 | End: 2024-09-04

## 2024-07-08 RX ORDER — OXYCODONE AND ACETAMINOPHEN 10; 325 MG/1; MG/1
1 TABLET ORAL EVERY 12 HOURS PRN
Qty: 60 TABLET | Refills: 0 | Status: SHIPPED | OUTPATIENT
Start: 2024-07-08 | End: 2024-08-06

## 2024-07-08 NOTE — PROGRESS NOTES
This note was completed with dictation software and grammatical errors may exist.    Referring Physician: No ref. provider found    PCP: Rosalinda Stockton NP      CC: knee pain    Interval history:  Patient returns to our clinic.  He is new patient to me but has seen Dr. Galindo in the past.  He is history of chronic bilateral knee pain, left greater than right.  He also has history of morbid obesity, hypertension and diabetes in his not a good surgical candidate.  He continues to have constant aching, throbbing pain in his bilateral knees.  Pain worsens standing, walking getting up.  Continues to be followed by bariatric surgery for weight loss consider potentially get knee replacement surgery in the near future.  He currently takes Percocet 10 mg Q 12 hours as needed with mild-to-moderate.  Denies any worsening weakness.  No bowel bladder    Prior HPI:   Bridgtete Barnes is a 69 y.o. male [facility] significant for morbid obesity, HTN, pre-diabetes, and atrial fibrillation on Eliquis presents for the evaluation of left knee pain. The patient reports that his pain began approximately since high school s/p football injury. The patient denies of prior left knee surgery. The patient reports of worsening severity since onset. The patient localizes his pain to the anterior aspect of his left knee. The patient reports of radiation down his left shin. The patient describes his pain as a sharp type of pain. The patient denies of numbness. The patient reports that his pain is a 8-10/10. Patient denies of any urinary/fecal incontinence, saddle anesthesia, or weakness.      Aggravating factors: walking, standing     Mitigating factors: pain somewhat improved after he gets moving; minimal relief with Norco  mg      Relevant Surgeries: no; Dr. Romero at [facility]; was told he had to lose weight prior to arthroplasty        Interventional Therapies: yes; steroid injections with minimal, short lived relief. Tried  "viscosupplementation without benefit.   01/18/2023: Left knee genicular nerve block-     ROS:  CONSTITUTIONAL: No fevers, chills, night sweats, wt. loss, appetite changes  SKIN: no rashes or itching  ENT: No headaches, head trauma, vision changes, or eye pain  LYMPH NODES: None noticed   CV: No chest pain, palpitations.   RESP: No shortness of breath, dyspnea on exertion, cough, wheezing, or hemoptysis  GI: No nausea, emesis, diarrhea, constipation, melena, hematochezia, pain.    : No dysuria, hematuria, urgency, or frequency   HEME: No easy bruising, bleeding problems  PSYCHIATRIC: No depression, anxiety, psychosis, hallucinations.  NEURO: No seizures, memory loss, dizziness or difficulty sleeping  MSK: no joint pain      Past Medical History:   Diagnosis Date    Atrial fibrillation     CKD (chronic kidney disease), stage III     Diabetes mellitus     Gout, unspecified     Hyperlipidemia     Hypertension     Sleep apnea uses CPAP      Past Surgical History:   Procedure Laterality Date    BLOCK, NERVE, GENICULAR Left 1/18/2023    Procedure: Block, Nerve, Genicular, Knee;  Surgeon: Kelechi Almeida MD;  Location: [facility];  Service: Pain Management;  Laterality: Left;    ILIAC ARTERY STENT       Family History   Problem Relation Name Age of Onset    Cancer Mother      Lung cancer Mother      Cancer Father      Lung cancer Father       Social History     Socioeconomic History    Marital status: Single   Tobacco Use    Smoking status: Never    Smokeless tobacco: Never   Substance and Sexual Activity    Alcohol use: Not Currently    Drug use: Never    Sexual activity: Not Currently         Medications/Allergies: See med card    Vitals:    07/08/24 1017   BP: 128/71   Pulse: (!) 50   Weight: (!) 178.3 kg (393 lb 1.3 oz)   Height: 5' 11" (1.803 m)   PainSc:   8   PainLoc: Knee         Physical exam:    GENERAL: A and O x3, the patient appears well groomed and is in no acute distress.  Skin: No rashes or obvious " lesions  HEENT: normocephalic, atraumatic  CARDIOVASCULAR:  Palpable peripheral pulses  LUNGS: easy work of breathing  ABDOMEN: soft, nontender   UPPER EXTREMITIES: Normal alignment, normal range of motion, no atrophy, no skin changes,  hair growth and nail growth normal and equal bilaterally. No swelling, no tenderness.    LOWER EXTREMITIES:  Normal alignment, normal range of motion, no atrophy, no skin changes,  hair growth and nail growth normal and equal bilaterally. No swelling, no tenderness.  Positive bilateral knee crepitus      LUMBAR SPINE  Lumbar spine: ROM is limited with flexion extension and oblique extension with moderate increased pain.    David's test causes no increased pain on either side.    Supine straight leg raise is negative bilaterally.    Internal and external rotation of the hip causes no increased pain on either side.  Myofascial exam: No tenderness to palpation across lumbar paraspinous muscles.      MENTAL STATUS: normal orientation, speech, language, and fund of knowledge for social situation.  Emotional state appropriate.  MOTOR: Tone and bulk: normal bilateral upper and lower Strength: normal   Delt Bi Tri WE WF     R 5 5 5 5 5 5   L 5 5 5 5 5 5     IP ADD ABD Quad TA Gas HAM  R 5 5 5 5 5 5 5  L 5 5 5 5 5 5 5    SENSATION: Light touch and pinprick intact bilaterally  REFLEXES: normal, symmetric, nonbrisk.  Toes down, no clonus. No hoffmans.  GAIT:  Uses cane for assistance       Imaging:  X-ray knee 4/2022  Prominent knee osteoarthritis without significant change from previous study.       Assessment:  Patient presents with knee pain  1. Chronic pain of left knee    2. Primary osteoarthritis of left knee    3. Physical deconditioning    4. Morbid obesity with BMI of 50.0-59.9, adult          Plan:  I have stressed the importance of physical activity and exercise to improve overall health  Reviewed pertinent imaging and records with patient  Continue care with bariatric  surgery  Okay to continue Percocet 10 mg q.12 hours as needed.  Script provided for 3 months.  UDS 1/2024.   Patient understands this is not an ideal long-term solution for his chronic knee pain.  He expressed understanding and will continue to follow up bariatric surgery in his primary care to optimize his health.  Follow up in 3 months.  Thank you for referring this interesting patient, and I look forward to continuing to collaborate in his care.

## 2024-10-01 ENCOUNTER — TELEPHONE (OUTPATIENT)
Dept: PAIN MEDICINE | Facility: CLINIC | Age: 69
End: 2024-10-01
Payer: MEDICARE

## 2024-10-01 DIAGNOSIS — R53.81 PHYSICAL DECONDITIONING: ICD-10-CM

## 2024-10-01 DIAGNOSIS — M17.12 PRIMARY OSTEOARTHRITIS OF LEFT KNEE: ICD-10-CM

## 2024-10-01 DIAGNOSIS — G89.29 CHRONIC PAIN OF LEFT KNEE: ICD-10-CM

## 2024-10-01 DIAGNOSIS — M25.562 CHRONIC PAIN OF LEFT KNEE: ICD-10-CM

## 2024-10-01 RX ORDER — OXYCODONE AND ACETAMINOPHEN 10; 325 MG/1; MG/1
1 TABLET ORAL EVERY 12 HOURS PRN
Qty: 60 TABLET | Refills: 0 | Status: SHIPPED | OUTPATIENT
Start: 2024-10-04 | End: 2024-11-03

## 2024-10-01 NOTE — TELEPHONE ENCOUNTER
----- Message from Nuvia sent at 10/1/2024  2:31 PM CDT -----  Regarding: refill  Type:  RX Refill Request    Who Called: pt    Refill or New Rx:refill    RX Name and Strength:oxyCODONE-acetaminophen (PERCOCET)  mg per tablet 60 tablet 0 9/4/2024 10/3/2024   Sig - Route: Take 1 tablet by mouth every 12 (twelve) hours as needed for Pain. - Oral   Sent to pharmacy as: oxyCODONE-acetaminophen (PERCOCET)  mg per tablet   Earliest Fill Date: 9/4/2024   Notes to Pharmacy: Quantity greater than 7 days is medically necessary.   E-Prescribing Status: Receipt confirmed by pharmacy (7/8/2024 10:42 AM CDT)         How is the patient currently taking it? (ex. 1XDay):see above    Is this a 30 day or 90 day RX:see above    Preferred Pharmacy with phone number:  [facility] - [place], [place] - [address]  [address]  [place] [address]  Phone: [phone] Fax: [phone]        Local or Mail Order:local    Ordering Provider:silvestre Moreno Call Back Number:[phone]      Additional Information: pt  he has an apt for 10/07, & needs refill sent to pharm he will be out tomorrow.  Please call to discuss.

## 2024-11-01 DIAGNOSIS — R53.81 PHYSICAL DECONDITIONING: ICD-10-CM

## 2024-11-01 DIAGNOSIS — G89.29 CHRONIC PAIN OF LEFT KNEE: ICD-10-CM

## 2024-11-01 DIAGNOSIS — M17.12 PRIMARY OSTEOARTHRITIS OF LEFT KNEE: ICD-10-CM

## 2024-11-01 DIAGNOSIS — M25.562 CHRONIC PAIN OF LEFT KNEE: ICD-10-CM

## 2024-11-01 RX ORDER — OXYCODONE AND ACETAMINOPHEN 10; 325 MG/1; MG/1
TABLET ORAL
Qty: 60 TABLET | Refills: 0 | OUTPATIENT
Start: 2024-11-01

## 2024-11-04 ENCOUNTER — OFFICE VISIT (OUTPATIENT)
Dept: PAIN MEDICINE | Facility: CLINIC | Age: 69
End: 2024-11-04
Payer: MEDICARE

## 2024-11-04 VITALS
DIASTOLIC BLOOD PRESSURE: 94 MMHG | HEART RATE: 68 BPM | SYSTOLIC BLOOD PRESSURE: 199 MMHG | WEIGHT: 315 LBS | HEIGHT: 71 IN | BODY MASS INDEX: 44.1 KG/M2

## 2024-11-04 DIAGNOSIS — R53.81 PHYSICAL DECONDITIONING: ICD-10-CM

## 2024-11-04 DIAGNOSIS — M17.12 PRIMARY OSTEOARTHRITIS OF LEFT KNEE: Primary | ICD-10-CM

## 2024-11-04 DIAGNOSIS — M17.12 PRIMARY OSTEOARTHRITIS OF LEFT KNEE: ICD-10-CM

## 2024-11-04 DIAGNOSIS — G89.29 CHRONIC PAIN OF LEFT KNEE: ICD-10-CM

## 2024-11-04 DIAGNOSIS — M25.562 CHRONIC PAIN OF LEFT KNEE: ICD-10-CM

## 2024-11-04 PROCEDURE — 3077F SYST BP >= 140 MM HG: CPT | Mod: CPTII,S$GLB,, | Performed by: PHYSICIAN ASSISTANT

## 2024-11-04 PROCEDURE — 1125F AMNT PAIN NOTED PAIN PRSNT: CPT | Mod: CPTII,S$GLB,, | Performed by: PHYSICIAN ASSISTANT

## 2024-11-04 PROCEDURE — 1159F MED LIST DOCD IN RCRD: CPT | Mod: CPTII,S$GLB,, | Performed by: PHYSICIAN ASSISTANT

## 2024-11-04 PROCEDURE — 4010F ACE/ARB THERAPY RXD/TAKEN: CPT | Mod: CPTII,S$GLB,, | Performed by: PHYSICIAN ASSISTANT

## 2024-11-04 PROCEDURE — 3008F BODY MASS INDEX DOCD: CPT | Mod: CPTII,S$GLB,, | Performed by: PHYSICIAN ASSISTANT

## 2024-11-04 PROCEDURE — 99214 OFFICE O/P EST MOD 30 MIN: CPT | Mod: S$GLB,,, | Performed by: PHYSICIAN ASSISTANT

## 2024-11-04 PROCEDURE — 99999 PR PBB SHADOW E&M-EST. PATIENT-LVL III: CPT | Mod: PBBFAC,,, | Performed by: PHYSICIAN ASSISTANT

## 2024-11-04 PROCEDURE — 3080F DIAST BP >= 90 MM HG: CPT | Mod: CPTII,S$GLB,, | Performed by: PHYSICIAN ASSISTANT

## 2024-11-04 PROCEDURE — 3046F HEMOGLOBIN A1C LEVEL >9.0%: CPT | Mod: CPTII,S$GLB,, | Performed by: PHYSICIAN ASSISTANT

## 2024-11-04 RX ORDER — OXYCODONE AND ACETAMINOPHEN 10; 325 MG/1; MG/1
1 TABLET ORAL EVERY 12 HOURS PRN
Qty: 60 TABLET | Refills: 0 | Status: SHIPPED | OUTPATIENT
Start: 2025-01-01 | End: 2025-01-30

## 2024-11-04 RX ORDER — OXYCODONE AND ACETAMINOPHEN 10; 325 MG/1; MG/1
1 TABLET ORAL EVERY 12 HOURS PRN
Qty: 60 TABLET | Refills: 0 | Status: SHIPPED | OUTPATIENT
Start: 2024-12-03 | End: 2025-01-01

## 2024-11-04 RX ORDER — OXYCODONE AND ACETAMINOPHEN 10; 325 MG/1; MG/1
1 TABLET ORAL EVERY 12 HOURS PRN
Qty: 60 TABLET | Refills: 0 | Status: SHIPPED | OUTPATIENT
Start: 2024-11-04 | End: 2024-12-03

## 2024-11-04 NOTE — PROGRESS NOTES
Referring Physician: No ref. provider found    PCP: Rosalinda Stockton NP      CC: knee pain    Interval history:  Bridgette Barnes is a 69 y.o. male with chronic bilateral knee pain, left greater than right.  He also has history of morbid obesity, hypertension and diabetes in his not a good surgical candidate.  He continues to have constant aching, throbbing pain in his bilateral knees.  Pain worsens standing, walking getting up.  Continues to be followed by bariatric surgery for weight loss consider potentially get knee replacement surgery in the near future.  He currently takes Percocet 10 mg Q 12 hours as needed with mild-to-moderate.  Denies any worsening weakness.  No bowel bladder.  He is going to [place] Wednesday to visit his cousin. Pain today is rated 8/10.  Pt has been seen in the clinic before, however pt is new to me.     History below per Dr. Almeida      Prior HPI:   Bridgette Barnes is a 69 y.o. male [facility] significant for morbid obesity, HTN, pre-diabetes, and atrial fibrillation on Eliquis presents for the evaluation of left knee pain. The patient reports that his pain began approximately since high school s/p football injury. The patient denies of prior left knee surgery. The patient reports of worsening severity since onset. The patient localizes his pain to the anterior aspect of his left knee. The patient reports of radiation down his left shin. The patient describes his pain as a sharp type of pain. The patient denies of numbness. The patient reports that his pain is a 8-10/10. Patient denies of any urinary/fecal incontinence, saddle anesthesia, or weakness.      Aggravating factors: walking, standing     Mitigating factors: pain somewhat improved after he gets moving; minimal relief with Norco  mg      Relevant Surgeries: no; Dr. Romero at [facility]; was told he had to lose weight prior to arthroplasty        Interventional Therapies: yes; steroid injections with  "minimal, short lived relief. Tried viscosupplementation without benefit.   01/18/2023: Left knee genicular nerve block-     ROS:  CONSTITUTIONAL: No fevers, chills, night sweats, wt. loss, appetite changes  SKIN: no rashes or itching  ENT: No headaches, head trauma, vision changes, or eye pain  LYMPH NODES: None noticed   CV: No chest pain, palpitations.   RESP: No shortness of breath, dyspnea on exertion, cough, wheezing, or hemoptysis  GI: No nausea, emesis, diarrhea, constipation, melena, hematochezia, pain.    : No dysuria, hematuria, urgency, or frequency   HEME: No easy bruising, bleeding problems  PSYCHIATRIC: No depression, anxiety, psychosis, hallucinations.  NEURO: No seizures, memory loss, dizziness or difficulty sleeping  MSK: no joint pain      Past Medical History:   Diagnosis Date    Atrial fibrillation     CKD (chronic kidney disease), stage III     Diabetes mellitus     Gout, unspecified     Hyperlipidemia     Hypertension     Sleep apnea uses CPAP      Past Surgical History:   Procedure Laterality Date    BLOCK, NERVE, GENICULAR Left 1/18/2023    Procedure: Block, Nerve, Genicular, Knee;  Surgeon: Kelechi Almeida MD;  Location: [facility] OR;  Service: Pain Management;  Laterality: Left;    ILIAC ARTERY STENT       Family History   Problem Relation Name Age of Onset    Cancer Mother      Lung cancer Mother      Cancer Father      Lung cancer Father       Social History     Socioeconomic History    Marital status: Single   Tobacco Use    Smoking status: Never    Smokeless tobacco: Never   Substance and Sexual Activity    Alcohol use: Not Currently    Drug use: Never    Sexual activity: Not Currently         Medications/Allergies: See med card    Vitals:    11/04/24 0834   BP: (!) 199/94   Pulse: 68   Weight: (!) 178.3 kg (393 lb 1.3 oz)   Height: 5' 11" (1.803 m)   PainSc:   8   PainLoc: Knee         Physical exam:    GENERAL: A and O x3, the patient appears well groomed and is in no acute distress.  Skin: " No rashes or obvious lesions  HEENT: normocephalic, atraumatic  CARDIOVASCULAR:  RRR  LUNGS: non labored breathing  ABDOMEN: soft, nontender   UPPER EXTREMITIES: Normal alignment, normal range of motion, no atrophy, no skin changes,  hair growth and nail growth normal and equal bilaterally. No swelling, no tenderness.    LOWER EXTREMITIES:  Normal alignment, normal range of motion, no atrophy, no skin changes,  hair growth and nail growth normal and equal bilaterally. No swelling, no tenderness.  Positive bilateral knee crepitus      LUMBAR SPINE  Lumbar spine: ROM is limited with flexion extension and oblique extension with moderate increased pain.    David's test causes no increased pain on either side.    Supine straight leg raise is negative bilaterally.    Internal and external rotation of the hip causes no increased pain on either side.  Myofascial exam: No tenderness to palpation across lumbar paraspinous muscles.      MENTAL STATUS: normal orientation, speech, language, and fund of knowledge for social situation.  Emotional state appropriate.  MOTOR: Tone and bulk: normal bilateral upper and lower Strength: normal   Delt Bi Tri WE WF     R 5 5 5 5 5 5   L 5 5 5 5 5 5     IP ADD ABD Quad TA Gas HAM  R 5 5 5 5 5 5 5  L 5 5 5 5 5 5 5    SENSATION: Light touch and pinprick intact bilaterally  REFLEXES: normal, symmetric, nonbrisk.  Toes down, no clonus. No hoffmans.  GAIT:  Uses cane for assistance       Imaging:  X-ray knee 4/2022  Prominent knee osteoarthritis without significant change from previous study.       Assessment:  Bridgette Barnes is a 69 y.o. female knee pain  1. Primary osteoarthritis of left knee    2. Physical deconditioning    3. Chronic pain of left knee        Plan:  I have stressed the importance of physical activity and exercise to improve overall health  Reviewed pertinent imaging and records with patient  Continue care with bariatric surgery  Okay to continue Percocet 10 mg q.12  hours as needed.  Script provided for 3 months.  UDS 1/2024.   Patient understands this is not an ideal long-term solution for his chronic knee pain.  He expressed understanding and will continue to follow up bariatric surgery in his primary care to optimize his health.  Follow up in 3 months.  All medication management was performed by Dr. Kelechi Almeida

## 2025-01-27 ENCOUNTER — OFFICE VISIT (OUTPATIENT)
Dept: PAIN MEDICINE | Facility: CLINIC | Age: 70
End: 2025-01-27
Payer: MEDICARE

## 2025-01-27 VITALS
BODY MASS INDEX: 44.1 KG/M2 | HEART RATE: 63 BPM | HEIGHT: 71 IN | SYSTOLIC BLOOD PRESSURE: 154 MMHG | WEIGHT: 315 LBS | DIASTOLIC BLOOD PRESSURE: 79 MMHG

## 2025-01-27 DIAGNOSIS — M25.562 CHRONIC PAIN OF LEFT KNEE: ICD-10-CM

## 2025-01-27 DIAGNOSIS — R53.81 PHYSICAL DECONDITIONING: ICD-10-CM

## 2025-01-27 DIAGNOSIS — E66.01 MORBID OBESITY WITH BMI OF 50.0-59.9, ADULT: ICD-10-CM

## 2025-01-27 DIAGNOSIS — G89.29 CHRONIC PAIN OF LEFT KNEE: ICD-10-CM

## 2025-01-27 DIAGNOSIS — Z79.891 CHRONIC USE OF OPIATE FOR THERAPEUTIC PURPOSE: Primary | ICD-10-CM

## 2025-01-27 DIAGNOSIS — M17.12 PRIMARY OSTEOARTHRITIS OF LEFT KNEE: ICD-10-CM

## 2025-01-27 PROCEDURE — 3078F DIAST BP <80 MM HG: CPT | Mod: CPTII,S$GLB,, | Performed by: PHYSICIAN ASSISTANT

## 2025-01-27 PROCEDURE — 3077F SYST BP >= 140 MM HG: CPT | Mod: CPTII,S$GLB,, | Performed by: PHYSICIAN ASSISTANT

## 2025-01-27 PROCEDURE — 1159F MED LIST DOCD IN RCRD: CPT | Mod: CPTII,S$GLB,, | Performed by: PHYSICIAN ASSISTANT

## 2025-01-27 PROCEDURE — 1125F AMNT PAIN NOTED PAIN PRSNT: CPT | Mod: CPTII,S$GLB,, | Performed by: PHYSICIAN ASSISTANT

## 2025-01-27 PROCEDURE — 80364 OPIOID &OPIATE ANALOG 5/MORE: CPT | Performed by: PHYSICIAN ASSISTANT

## 2025-01-27 PROCEDURE — 3008F BODY MASS INDEX DOCD: CPT | Mod: CPTII,S$GLB,, | Performed by: PHYSICIAN ASSISTANT

## 2025-01-27 PROCEDURE — 99214 OFFICE O/P EST MOD 30 MIN: CPT | Mod: S$GLB,,, | Performed by: PHYSICIAN ASSISTANT

## 2025-01-27 PROCEDURE — 99999 PR PBB SHADOW E&M-EST. PATIENT-LVL III: CPT | Mod: PBBFAC,,, | Performed by: PHYSICIAN ASSISTANT

## 2025-01-27 PROCEDURE — 99213 OFFICE O/P EST LOW 20 MIN: CPT | Mod: PBBFAC,PN | Performed by: PHYSICIAN ASSISTANT

## 2025-01-27 RX ORDER — OXYCODONE AND ACETAMINOPHEN 10; 325 MG/1; MG/1
1 TABLET ORAL EVERY 12 HOURS PRN
Qty: 60 TABLET | Refills: 0 | Status: SHIPPED | OUTPATIENT
Start: 2025-01-30 | End: 2025-02-28

## 2025-01-27 RX ORDER — OXYCODONE AND ACETAMINOPHEN 10; 325 MG/1; MG/1
1 TABLET ORAL EVERY 12 HOURS PRN
Qty: 60 TABLET | Refills: 0 | Status: SHIPPED | OUTPATIENT
Start: 2025-02-28 | End: 2025-03-29

## 2025-01-27 RX ORDER — OXYCODONE AND ACETAMINOPHEN 10; 325 MG/1; MG/1
1 TABLET ORAL EVERY 12 HOURS PRN
Qty: 60 TABLET | Refills: 0 | Status: SHIPPED | OUTPATIENT
Start: 2025-03-29 | End: 2025-04-27

## 2025-01-27 NOTE — PROGRESS NOTES
Referring Physician: No ref. provider found    PCP: Rosalinda Stockton NP      CC: knee pain    Interval history:  Bridgette Barnes is a 69 y.o. male with chronic bilateral knee pain, left greater than right.  He also has history of morbid obesity, hypertension and diabetes in his not a good surgical candidate.  He continues to have constant aching, throbbing pain in his bilateral knees.  Pain worsens standing, walking getting up.  Continues to be followed by bariatric surgery for weight loss consider potentially get knee replacement surgery in the near future.  He currently takes Percocet 10 mg Q 12 hours as needed with mild-to-moderate.  Denies any worsening weakness.  No bowel bladder.  Pain today is rated 8/10.        Prior HPI:   Bridgette Barnes is a 69 y.o. male [facility] significant for morbid obesity, HTN, pre-diabetes, and atrial fibrillation on Eliquis presents for the evaluation of left knee pain. The patient reports that his pain began approximately since high school s/p football injury. The patient denies of prior left knee surgery. The patient reports of worsening severity since onset. The patient localizes his pain to the anterior aspect of his left knee. The patient reports of radiation down his left shin. The patient describes his pain as a sharp type of pain. The patient denies of numbness. The patient reports that his pain is a 8-10/10. Patient denies of any urinary/fecal incontinence, saddle anesthesia, or weakness.      Aggravating factors: walking, standing     Mitigating factors: pain somewhat improved after he gets moving; minimal relief with Norco  mg      Relevant Surgeries: no; Dr. Romero at [facility]; was told he had to lose weight prior to arthroplasty        Interventional Therapies: yes; steroid injections with minimal, short lived relief. Tried viscosupplementation without benefit.   01/18/2023: Left knee genicular nerve block-     ROS:  CONSTITUTIONAL: No fevers,  "chills, night sweats, wt. loss, appetite changes  SKIN: no rashes or itching  ENT: No headaches, head trauma, vision changes, or eye pain  LYMPH NODES: None noticed   CV: No chest pain, palpitations.   RESP: No shortness of breath, dyspnea on exertion, cough, wheezing, or hemoptysis  GI: No nausea, emesis, diarrhea, constipation, melena, hematochezia, pain.    : No dysuria, hematuria, urgency, or frequency   HEME: No easy bruising, bleeding problems  PSYCHIATRIC: No depression, anxiety, psychosis, hallucinations.  NEURO: No seizures, memory loss, dizziness or difficulty sleeping  MSK: no joint pain      Past Medical History:   Diagnosis Date    Atrial fibrillation     CKD (chronic kidney disease), stage III     Diabetes mellitus     Gout, unspecified     Hyperlipidemia     Hypertension     Sleep apnea uses CPAP      Past Surgical History:   Procedure Laterality Date    BLOCK, NERVE, GENICULAR Left 1/18/2023    Procedure: Block, Nerve, Genicular, Knee;  Surgeon: Kelechi Almeida MD;  Location: [facility] OR;  Service: Pain Management;  Laterality: Left;    ILIAC ARTERY STENT       Family History   Problem Relation Name Age of Onset    Cancer Mother      Lung cancer Mother      Cancer Father      Lung cancer Father       Social History     Socioeconomic History    Marital status: Single   Tobacco Use    Smoking status: Never    Smokeless tobacco: Never   Substance and Sexual Activity    Alcohol use: Not Currently    Drug use: Never    Sexual activity: Not Currently         Medications/Allergies: See med card    Vitals:    01/27/25 1033   BP: (!) 154/79   Pulse: 63   Weight: (!) 178.3 kg (393 lb 1.3 oz)   Height: 5' 11" (1.803 m)   PainSc:   8   PainLoc: Knee         Physical exam:    GENERAL: A and O x3, the patient appears well groomed and is in no acute distress.  Skin: No rashes or obvious lesions  HEENT: normocephalic, atraumatic  CARDIOVASCULAR:  RRR  LUNGS: non labored breathing  ABDOMEN: soft, nontender   UPPER " EXTREMITIES: Normal alignment, normal range of motion, no atrophy, no skin changes,  hair growth and nail growth normal and equal bilaterally. No swelling, no tenderness.    LOWER EXTREMITIES:  Normal alignment, normal range of motion, no atrophy, no skin changes,  hair growth and nail growth normal and equal bilaterally. No swelling, no tenderness.  Positive bilateral knee crepitus      LUMBAR SPINE  Lumbar spine: ROM is limited with flexion extension and oblique extension with moderate increased pain.    David's test causes no increased pain on either side.    Supine straight leg raise is negative bilaterally.    Internal and external rotation of the hip causes no increased pain on either side.  Myofascial exam: No tenderness to palpation across lumbar paraspinous muscles.      MENTAL STATUS: normal orientation, speech, language, and fund of knowledge for social situation.  Emotional state appropriate.  MOTOR: Tone and bulk: normal bilateral upper and lower Strength: normal   Delt Bi Tri WE WF     R 5 5 5 5 5 5   L 5 5 5 5 5 5     IP ADD ABD Quad TA Gas HAM  R 5 5 5 5 5 5 5  L 5 5 5 5 5 5 5    SENSATION: Light touch and pinprick intact bilaterally  REFLEXES: normal, symmetric, nonbrisk.  Toes down, no clonus. No hoffmans.  GAIT:  Uses cane for assistance       Imaging:  X-ray knee 4/2022  Prominent knee osteoarthritis without significant change from previous study.       Assessment:  Bridgette Barnes is a 69 y.o. female knee pain  1. Chronic use of opiate for therapeutic purpose    2. Physical deconditioning    3. Morbid obesity with BMI of 50.0-59.9, adult    4. Chronic pain of left knee        Plan:  I have stressed the importance of physical activity and exercise to improve overall health  Reviewed pertinent imaging and records with patient  Continue care with bariatric surgery  Okay to continue Percocet 10 mg q.12 hours as needed.  Script provided for 3 months.  UDS 1/2024.   Patient understands this is  not an ideal long-term solution for his chronic knee pain.  He expressed understanding and will continue to follow up bariatric surgery in his primary care to optimize his health.   Follow up with PCP regarding elevated BP  Follow up in 3 months.  All medication management was performed by Dr. Kelechi Almeida

## 2025-02-01 LAB

## 2025-03-26 NOTE — PROGRESS NOTES
Problem: Chronic Conditions and Co-morbidities  Goal: Patient's chronic conditions and co-morbidity symptoms are monitored and maintained or improved  Outcome: Progressing  Flowsheets (Taken 3/23/2025 0456 by Amanda Winchester RN)  Care Plan - Patient's Chronic Conditions and Co-Morbidity Symptoms are Monitored and Maintained or Improved:   Monitor and assess patient's chronic conditions and comorbid symptoms for stability, deterioration, or improvement   Collaborate with multidisciplinary team to address chronic and comorbid conditions and prevent exacerbation or deterioration     Problem: Discharge Planning  Goal: Discharge to home or other facility with appropriate resources  Outcome: Progressing  Flowsheets (Taken 3/23/2025 0456 by Amanda Winchester, RN)  Discharge to home or other facility with appropriate resources:   Arrange for needed discharge resources and transportation as appropriate   Identify barriers to discharge with patient and caregiver     Problem: ABCDS Injury Assessment  Goal: Absence of physical injury  Outcome: Progressing  Flowsheets (Taken 3/23/2025 0456 by Amanda Winchester, RN)  Absence of Physical Injury: Implement safety measures based on patient assessment     Problem: Pain  Goal: Verbalizes/displays adequate comfort level or baseline comfort level  Outcome: Progressing  Flowsheets (Taken 3/23/2025 0456 by Amanda Winchester, RN)  Verbalizes/displays adequate comfort level or baseline comfort level:   Encourage patient to monitor pain and request assistance   Assess pain using appropriate pain scale   Administer analgesics based on type and severity of pain and evaluate response     Problem: Safety - Adult  Goal: Free from fall injury  Outcome: Progressing  Flowsheets (Taken 3/23/2025 0456 by Amanda Winchester, RN)  Free From Fall Injury: Instruct family/caregiver on patient safety     Problem: Skin/Tissue Integrity  Goal: Skin integrity remains intact  Description: 1.  Monitor for areas of redness  Referring Physician: No ref. provider found    PCP: Rosalinda Stockton NP    CC: left knee pain    INTERVAL HPI: Bridgette Barnes is a 67 y.o. male with PMH significant for morbid obesity, HTN, pre-diabetes, and atrial fibrillation on Eliquis presents as an established patient for the management of left knee pain. The patient presents today for medication refill. Today, the patient continues to localize his worse pain to his left knee. The patient localizes his pain to the anterior aspect of his left knee. The patient reports of radiation down his left shin. The patient describes his pain as a sharp type of pain. The patient denies of numbness. The patient reports that his pain is a 9/10.  The patient reports benefit with Percocet 10 but reports he could use an extra dose throughout the day.  In regards to weight loss, the patient is under the care of Dr. Burgos and is currently undergoing surgical clearance to receive a surgical date.  The patient reports he plans to under go knee replacement s/p gastric sleeve. The patient denies of any significant changes in his health since his last appointment. The patient also denies of any changes in the character of his pain since his last appointment.   Patient denies of any urinary/fecal incontinence, saddle anesthesia, or weakness.     HPI:   Bridgette Barnes is a 68 y.o. male with PMH significant for morbid obesity, HTN, pre-diabetes, and atrial fibrillation on Eliquis presents for the evaluation of left knee pain. The patient reports that his pain began approximately since high school s/p football injury. The patient denies of prior left knee surgery. The patient reports of worsening severity since onset. The patient localizes his pain to the anterior aspect of his left knee. The patient reports of radiation down his left shin. The patient describes his pain as a sharp type of pain. The patient denies of numbness. The patient reports that his pain is a 8-10/10. Patient  "denies of any urinary/fecal incontinence, saddle anesthesia, or weakness.     Aggravating factors: walking, standing    Mitigating factors: pain somewhat improved after he gets moving; minimal relief with Norco  mg     Relevant Surgeries: no; Dr. Romero at [facility]; was told he had to lose weight prior to arthroplasty      Interventional Therapies: yes; steroid injections with minimal, short lived relief. Tried viscosupplementation without benefit.   01/18/2023: Left knee genicular nerve block-     : Reviewed and consistent with medication use as prescribed.                   Pain Medications:         Currently taking: Percocet 10-325mg po q12 hrs.     Anticoagulation: yes; Eliquis    ROS:  Review of Systems   Constitutional:  Negative for chills and fever.   HENT:  Negative for tinnitus.    Eyes:  Negative for visual disturbance.   Respiratory:  Negative for shortness of breath.    Cardiovascular:  Negative for chest pain.   Gastrointestinal:  Negative for nausea and vomiting.   Genitourinary:  Negative for difficulty urinating.   Musculoskeletal:  Positive for arthralgias and gait problem.   Skin:  Negative for rash.   Allergic/Immunologic: Negative for immunocompromised state.   Neurological:  Negative for syncope and numbness.   Hematological:  Does not bruise/bleed easily.   Psychiatric/Behavioral:  Negative for suicidal ideas.    All other systems reviewed and are negative.     MEDICAL, SURGICAL, FAMILY, SOCIAL HX: reviewed    MEDICATIONS/ALLERGIES: reviewed         Allergies: See med card    Vitals:    05/01/23 0829   BP: (!) 153/83   Pulse: (!) 58   Weight: (!) 176.9 kg (390 lb)   Height: 5' 11" (1.803 m)   PainSc: 10-Worst pain ever           Physical exam:  Physical Exam  Vitals and nursing note reviewed.   Constitutional:       Appearance: Normal appearance. He is not toxic-appearing or diaphoretic.   HENT:      Head: Normocephalic and atraumatic.   Eyes:      General:         Right " eye: No discharge.         Left eye: No discharge.      Extraocular Movements: Extraocular movements intact.      Conjunctiva/sclera: Conjunctivae normal.   Cardiovascular:      Rate and Rhythm: Normal rate.   Pulmonary:      Effort: Pulmonary effort is normal. No respiratory distress.      Breath sounds: Normal breath sounds.   Abdominal:      Palpations: Abdomen is soft.   Skin:     General: Skin is warm and dry.      Findings: No rash.   Neurological:      Mental Status: He is alert and oriented to person, place, and time.   Psychiatric:         Mood and Affect: Mood and affect normal.         Cognition and Memory: Memory normal.         Judgment: Judgment normal.        UPPER EXTREMITIES: Normal alignment, normal range of motion, no atrophy, no skin changes,  hair growth and nail growth normal and equal bilaterally. No swelling, no tenderness.    LOWER EXTREMITIES:  Normal alignment, normal range of motion, no atrophy, no skin changes,  hair growth and nail growth normal and equal bilaterally. No swelling, no tenderness.    Knee exam:    Extension/flexion equal bilaterally.   Positive crepitus with in the left knee  No effusion both knees.    Positive joint line tenderness     CRANIAL NERVES:  II:  PERRL bilaterally,   III,IV,VI: EOMI.    V:  Facial sensation equal bilaterally  VII:  Facial motor function normal.  VIII:  Hearing equal to finger rub bilaterally  IX/X: Gag normal, palate symmetric  XI:  Shoulder shrug equal, head turn equal  XII:  Tongue midline without fasciculations      MOTOR: Tone and bulk: normal     MUSCLE STRENGTH:  Hip Flexion: Right 5/5, Left 5/5  Hip Extension: Right 5/5, Left 5/5  Leg Flexion: Right 5/5, Left 5/5  Leg Extension: Right 5/5, Left 5/5  Plantar Flexion: Right 5/5, Left 5/5  Dorsiflexion: Right 5/5, Left 5/5    Delt Bi Tri     Right: 5/5 5/5 5/5 5/5   Left: 5/5 5/5 5/5 5/5     SENSATION: Light touch and pinprick intact bilaterally  REFLEXES: normal, symmetric, nonbrisk.   "Toes down, no clonus. Negative corrales's sign bilaterally.  GAIT: antalgic gait; uses a single point cane for assistance with ambulation       Imaging: per last PCP visit "Last x-ray was in 4/2022 and revealed: Again noted is severe narrowing of the medial joint space with medial lateral marginal osteophytes. Prominent patellofemoral joint space narrowing and osteophytes. No acute fracture or dislocation. No joint effusion. Soft tissues normal."    Assessment: Bridgette Barnes is a 67 y.o. male with PMH significant for morbid obesity, HTN, pre-diabetes, and atrial fibrillation on Eliquis presents as an established patient for the management of left knee pain. The patient presents today for medication refill.   Patient with severe OA of the left knee. Patient reports that he is unable to get a knee arthroplasty given his morbid obesity. Patient continues to localize worse pain to his left knee. The patient reports benefit with Percocet.  Treatment plan outlined below.     Encounter Diagnoses   Name Primary?    Chronic use of opiate for therapeutic purpose     Chronic pain of left knee Yes    Primary osteoarthritis of left knee     Morbid obesity with BMI of 50.0-59.9, adult     Physical deconditioning         Plan:  - I discussed the plan of care with the patient and encouraged progression towards weight loss goals.  Pt verb understanding.   - Refilled Percocet  mg PO q 12 hr PRN for breakthrough pain; # 60 tablets; 2 refills.  reviewed without discrepancies. Patient requested possible increase in quantity, I explained to the patient that he is currently at a reasonable dosage and quantity for his presenting pain. I advised the patient to utilize Tylenol arthritis for between dosages.   - UDS collected today, last had pain medication yesterday.   - I have stressed the importance of physical activity and a home exercise plan to help with chronic pain and improve health.  - Discussed Dr. Galindo's previous med " management plan with patient.  (Explained to the patient that I do not intend to manage his left knee pathology with chronic opioids for the rest of his life as that is not appropriate. I outlined that the patient must make substantial progress in regards to weight loss with progression to left knee arthroplasty within 6 months to 1 year. After which, I will no longer provide the patient with opioids. Patient in agreement.)  - F/U 3 months or sooner if need. I advised the patient that if he did not have a plan to reach weight loss goals we would no longer continue opioid therapy as agreed upon during initial visit.   All medication management performed by Dr. Almeida.    Kriss Tan, NP

## 2025-04-24 ENCOUNTER — OFFICE VISIT (OUTPATIENT)
Dept: PAIN MEDICINE | Facility: CLINIC | Age: 70
End: 2025-04-24
Payer: MEDICAID

## 2025-04-24 VITALS
HEART RATE: 73 BPM | BODY MASS INDEX: 44.1 KG/M2 | HEIGHT: 71 IN | WEIGHT: 315 LBS | SYSTOLIC BLOOD PRESSURE: 157 MMHG | DIASTOLIC BLOOD PRESSURE: 96 MMHG

## 2025-04-24 DIAGNOSIS — Z79.891 CHRONIC USE OF OPIATE FOR THERAPEUTIC PURPOSE: ICD-10-CM

## 2025-04-24 DIAGNOSIS — R53.81 PHYSICAL DECONDITIONING: ICD-10-CM

## 2025-04-24 DIAGNOSIS — M17.12 PRIMARY OSTEOARTHRITIS OF LEFT KNEE: ICD-10-CM

## 2025-04-24 DIAGNOSIS — G89.29 CHRONIC PAIN OF LEFT KNEE: Primary | ICD-10-CM

## 2025-04-24 DIAGNOSIS — M25.562 CHRONIC PAIN OF LEFT KNEE: Primary | ICD-10-CM

## 2025-04-24 DIAGNOSIS — M25.562 CHRONIC PAIN OF LEFT KNEE: ICD-10-CM

## 2025-04-24 DIAGNOSIS — G89.29 CHRONIC PAIN OF LEFT KNEE: ICD-10-CM

## 2025-04-24 DIAGNOSIS — Z13.31 POSITIVE DEPRESSION SCREENING: ICD-10-CM

## 2025-04-24 DIAGNOSIS — E66.01 MORBID OBESITY WITH BMI OF 50.0-59.9, ADULT: ICD-10-CM

## 2025-04-24 PROCEDURE — 3077F SYST BP >= 140 MM HG: CPT | Mod: CPTII,,, | Performed by: PHYSICIAN ASSISTANT

## 2025-04-24 PROCEDURE — 1125F AMNT PAIN NOTED PAIN PRSNT: CPT | Mod: CPTII,,, | Performed by: PHYSICIAN ASSISTANT

## 2025-04-24 PROCEDURE — 4010F ACE/ARB THERAPY RXD/TAKEN: CPT | Mod: CPTII,,, | Performed by: PHYSICIAN ASSISTANT

## 2025-04-24 PROCEDURE — 99213 OFFICE O/P EST LOW 20 MIN: CPT | Mod: PBBFAC,PN | Performed by: PHYSICIAN ASSISTANT

## 2025-04-24 PROCEDURE — 99999 PR PBB SHADOW E&M-EST. PATIENT-LVL III: CPT | Mod: PBBFAC,,, | Performed by: PHYSICIAN ASSISTANT

## 2025-04-24 PROCEDURE — 3008F BODY MASS INDEX DOCD: CPT | Mod: CPTII,,, | Performed by: PHYSICIAN ASSISTANT

## 2025-04-24 PROCEDURE — 99214 OFFICE O/P EST MOD 30 MIN: CPT | Mod: S$PBB,,, | Performed by: PHYSICIAN ASSISTANT

## 2025-04-24 PROCEDURE — 1159F MED LIST DOCD IN RCRD: CPT | Mod: CPTII,,, | Performed by: PHYSICIAN ASSISTANT

## 2025-04-24 PROCEDURE — 3079F DIAST BP 80-89 MM HG: CPT | Mod: CPTII,,, | Performed by: PHYSICIAN ASSISTANT

## 2025-04-24 RX ORDER — OXYCODONE AND ACETAMINOPHEN 10; 325 MG/1; MG/1
1 TABLET ORAL EVERY 12 HOURS PRN
Qty: 60 TABLET | Refills: 0 | Status: SHIPPED | OUTPATIENT
Start: 2025-05-26 | End: 2025-06-24

## 2025-04-24 RX ORDER — OXYCODONE AND ACETAMINOPHEN 10; 325 MG/1; MG/1
1 TABLET ORAL EVERY 12 HOURS PRN
Qty: 60 TABLET | Refills: 0 | Status: SHIPPED | OUTPATIENT
Start: 2025-04-27 | End: 2025-05-26

## 2025-04-24 RX ORDER — OXYCODONE AND ACETAMINOPHEN 10; 325 MG/1; MG/1
1 TABLET ORAL EVERY 12 HOURS PRN
Qty: 60 TABLET | Refills: 0 | Status: SHIPPED | OUTPATIENT
Start: 2025-06-24 | End: 2025-07-23

## 2025-04-24 NOTE — PROGRESS NOTES
Referring Physician: No ref. provider found    PCP: Rosalinda Stockton NP      CC: knee pain    Interval history:  Bridgette Barnes is a 70 y.o. male with chronic bilateral knee pain, left greater than right.  He also has history of morbid obesity, hypertension and diabetes in his not a good surgical candidate.  He continues to have constant aching, throbbing pain in his bilateral knees.  Pain worsens standing, walking getting up.  Continues to be followed by bariatric surgery for weight loss consider potentially get knee replacement surgery in the near future.  He currently takes Percocet 10 mg Q 12 hours as needed with mild-to-moderate.  Denies any worsening weakness.  No bowel bladder.  He has lost 30 lbs on Monjouro. Pain today is rated 8/10.        Prior HPI:   Bridgette Barnes is a 70 y.o. male [facility] significant for morbid obesity, HTN, pre-diabetes, and atrial fibrillation on Eliquis presents for the evaluation of left knee pain. The patient reports that his pain began approximately since high school s/p football injury. The patient denies of prior left knee surgery. The patient reports of worsening severity since onset. The patient localizes his pain to the anterior aspect of his left knee. The patient reports of radiation down his left shin. The patient describes his pain as a sharp type of pain. The patient denies of numbness. The patient reports that his pain is a 8-10/10. Patient denies of any urinary/fecal incontinence, saddle anesthesia, or weakness.      Aggravating factors: walking, standing     Mitigating factors: pain somewhat improved after he gets moving; minimal relief with Norco  mg      Relevant Surgeries: no; Dr. Romero at [facility]; was told he had to lose weight prior to arthroplasty        Interventional Therapies: yes; steroid injections with minimal, short lived relief. Tried viscosupplementation without benefit.   01/18/2023: Left knee genicular nerve block-  "    ROS:  CONSTITUTIONAL: No fevers, chills, night sweats, wt. loss, appetite changes  SKIN: no rashes or itching  ENT: No headaches, head trauma, vision changes, or eye pain  LYMPH NODES: None noticed   CV: No chest pain, palpitations.   RESP: No shortness of breath, dyspnea on exertion, cough, wheezing, or hemoptysis  GI: No nausea, emesis, diarrhea, constipation, melena, hematochezia, pain.    : No dysuria, hematuria, urgency, or frequency   HEME: No easy bruising, bleeding problems  PSYCHIATRIC: No depression, anxiety, psychosis, hallucinations.  NEURO: No seizures, memory loss, dizziness or difficulty sleeping  MSK: no joint pain      Past Medical History:   Diagnosis Date    Atrial fibrillation     CKD (chronic kidney disease), stage III     Diabetes mellitus     Gout, unspecified     Hyperlipidemia     Hypertension     Sleep apnea uses CPAP      Past Surgical History:   Procedure Laterality Date    BLOCK, NERVE, GENICULAR Left 1/18/2023    Procedure: Block, Nerve, Genicular, Knee;  Surgeon: Kelechi Almeida MD;  Location: [facility];  Service: Pain Management;  Laterality: Left;    ILIAC ARTERY STENT       Family History   Problem Relation Name Age of Onset    Cancer Mother      Lung cancer Mother      Cancer Father      Lung cancer Father       Social History     Socioeconomic History    Marital status: Single   Tobacco Use    Smoking status: Never    Smokeless tobacco: Never   Substance and Sexual Activity    Alcohol use: Not Currently    Drug use: Never    Sexual activity: Not Currently         Medications/Allergies: See med card    Vitals:    04/24/25 1004   BP: (!) 196/88   Pulse: 73   Weight: (!) 178.3 kg (393 lb 1.3 oz)   Height: 5' 11" (1.803 m)   PainSc:   8   PainLoc: Knee         Physical exam:    GENERAL: A and O x3, the patient appears well groomed and is in no acute distress.  Skin: No rashes or obvious lesions  HEENT: normocephalic, atraumatic  CARDIOVASCULAR:  RRR  LUNGS: non labored " breathing  ABDOMEN: soft, nontender   UPPER EXTREMITIES: Normal alignment, normal range of motion, no atrophy, no skin changes,  hair growth and nail growth normal and equal bilaterally. No swelling, no tenderness.    LOWER EXTREMITIES:  Normal alignment, normal range of motion, no atrophy, no skin changes,  hair growth and nail growth normal and equal bilaterally. No swelling, no tenderness.  Positive bilateral knee crepitus      LUMBAR SPINE  Lumbar spine: ROM is limited with flexion extension and oblique extension with moderate increased pain.    David's test causes no increased pain on either side.    Supine straight leg raise is negative bilaterally.    Internal and external rotation of the hip causes no increased pain on either side.  Myofascial exam: No tenderness to palpation across lumbar paraspinous muscles.      MENTAL STATUS: normal orientation, speech, language, and fund of knowledge for social situation.  Emotional state appropriate.  MOTOR: Tone and bulk: normal bilateral upper and lower Strength: normal   Delt Bi Tri WE WF     R 5 5 5 5 5 5   L 5 5 5 5 5 5     IP ADD ABD Quad TA Gas HAM  R 5 5 5 5 5 5 5  L 5 5 5 5 5 5 5    SENSATION: Light touch and pinprick intact bilaterally  REFLEXES: normal, symmetric, nonbrisk.  Toes down, no clonus. No hoffmans.  GAIT:  Uses cane for assistance       Imaging:  X-ray knee 4/2022  Prominent knee osteoarthritis without significant change from previous study.       Assessment:  Bridgette Barnes is a 70 y.o. female knee pain  1. Chronic pain of left knee    2. Primary osteoarthritis of left knee    3. Physical deconditioning    4. Morbid obesity with BMI of 50.0-59.9, adult    5. Chronic use of opiate for therapeutic purpose    6. Positive depression screening        Plan:  I have stressed the importance of physical activity and exercise to improve overall health  Reviewed pertinent imaging and records with patient  Continue care with bariatric surgery  Okay to  continue Percocet 10 mg q.12 hours as needed.  Script provided for 3 months.  UDS 1/2025.   Patient understands this is not an ideal long-term solution for his chronic knee pain.  He expressed understanding and will continue to follow up bariatric surgery in his primary care to optimize his health.   Follow up with PCP regarding elevated BP  Follow up in 3 months.  All medication management was performed by Dr. Kelechi Almeida

## 2025-07-21 ENCOUNTER — OFFICE VISIT (OUTPATIENT)
Dept: PAIN MEDICINE | Facility: CLINIC | Age: 70
End: 2025-07-21
Payer: MEDICARE

## 2025-07-21 VITALS
HEIGHT: 71 IN | WEIGHT: 315 LBS | BODY MASS INDEX: 44.1 KG/M2 | HEART RATE: 65 BPM | SYSTOLIC BLOOD PRESSURE: 141 MMHG | DIASTOLIC BLOOD PRESSURE: 77 MMHG

## 2025-07-21 DIAGNOSIS — M25.562 CHRONIC PAIN OF LEFT KNEE: ICD-10-CM

## 2025-07-21 DIAGNOSIS — Z79.891 CHRONIC USE OF OPIATE FOR THERAPEUTIC PURPOSE: ICD-10-CM

## 2025-07-21 DIAGNOSIS — G89.29 CHRONIC PAIN OF LEFT KNEE: ICD-10-CM

## 2025-07-21 DIAGNOSIS — R53.81 PHYSICAL DECONDITIONING: ICD-10-CM

## 2025-07-21 DIAGNOSIS — M17.12 PRIMARY OSTEOARTHRITIS OF LEFT KNEE: Primary | ICD-10-CM

## 2025-07-21 DIAGNOSIS — M17.12 PRIMARY OSTEOARTHRITIS OF LEFT KNEE: ICD-10-CM

## 2025-07-21 DIAGNOSIS — Z13.31 POSITIVE DEPRESSION SCREENING: ICD-10-CM

## 2025-07-21 PROCEDURE — 4010F ACE/ARB THERAPY RXD/TAKEN: CPT | Mod: CPTII,S$GLB,, | Performed by: PHYSICIAN ASSISTANT

## 2025-07-21 PROCEDURE — 3078F DIAST BP <80 MM HG: CPT | Mod: CPTII,S$GLB,, | Performed by: PHYSICIAN ASSISTANT

## 2025-07-21 PROCEDURE — 1125F AMNT PAIN NOTED PAIN PRSNT: CPT | Mod: CPTII,S$GLB,, | Performed by: PHYSICIAN ASSISTANT

## 2025-07-21 PROCEDURE — 3008F BODY MASS INDEX DOCD: CPT | Mod: CPTII,S$GLB,, | Performed by: PHYSICIAN ASSISTANT

## 2025-07-21 PROCEDURE — 99999 PR PBB SHADOW E&M-EST. PATIENT-LVL III: CPT | Mod: PBBFAC,,, | Performed by: PHYSICIAN ASSISTANT

## 2025-07-21 PROCEDURE — 1159F MED LIST DOCD IN RCRD: CPT | Mod: CPTII,S$GLB,, | Performed by: PHYSICIAN ASSISTANT

## 2025-07-21 PROCEDURE — 3077F SYST BP >= 140 MM HG: CPT | Mod: CPTII,S$GLB,, | Performed by: PHYSICIAN ASSISTANT

## 2025-07-21 PROCEDURE — 1160F RVW MEDS BY RX/DR IN RCRD: CPT | Mod: CPTII,S$GLB,, | Performed by: PHYSICIAN ASSISTANT

## 2025-07-21 PROCEDURE — 99214 OFFICE O/P EST MOD 30 MIN: CPT | Mod: S$GLB,,, | Performed by: PHYSICIAN ASSISTANT

## 2025-07-21 RX ORDER — OXYCODONE AND ACETAMINOPHEN 10; 325 MG/1; MG/1
1 TABLET ORAL EVERY 12 HOURS PRN
Qty: 60 TABLET | Refills: 0 | Status: SHIPPED | OUTPATIENT
Start: 2025-09-19 | End: 2025-10-18

## 2025-07-21 RX ORDER — OXYCODONE AND ACETAMINOPHEN 10; 325 MG/1; MG/1
1 TABLET ORAL EVERY 12 HOURS PRN
Qty: 60 TABLET | Refills: 0 | Status: SHIPPED | OUTPATIENT
Start: 2025-08-21 | End: 2025-09-19

## 2025-07-21 RX ORDER — OXYCODONE AND ACETAMINOPHEN 10; 325 MG/1; MG/1
1 TABLET ORAL EVERY 12 HOURS PRN
Qty: 60 TABLET | Refills: 0 | Status: SHIPPED | OUTPATIENT
Start: 2025-07-23 | End: 2025-08-21

## 2025-07-21 NOTE — PROGRESS NOTES
Referring Physician: No ref. provider found    PCP: Rosalinda Stockton NP      CC: knee pain    Interval history:  Bridgette Barnes is a 70 y.o. male with chronic bilateral knee pain, left greater than right.  He also has history of morbid obesity, hypertension and diabetes in his not a good surgical candidate.  He continues to have constant aching, throbbing pain in his bilateral knees.  Pain worsens standing, walking getting up.  Continues to be followed by bariatric surgery for weight loss consider potentially get knee replacement surgery in the near future.  He currently takes Percocet 10 mg Q 12 hours as needed with mild-to-moderate.  Denies any worsening weakness.  No bowel bladder.  He has lost 30 lbs on Monjouro. Pain today is rated 8/10.      Prior HPI:   Bridgette Barnes is a 70 y.o. male [facility] significant for morbid obesity, HTN, pre-diabetes, and atrial fibrillation on Eliquis presents for the evaluation of left knee pain. The patient reports that his pain began approximately since high school s/p football injury. The patient denies of prior left knee surgery. The patient reports of worsening severity since onset. The patient localizes his pain to the anterior aspect of his left knee. The patient reports of radiation down his left shin. The patient describes his pain as a sharp type of pain. The patient denies of numbness. The patient reports that his pain is a 8-10/10. Patient denies of any urinary/fecal incontinence, saddle anesthesia, or weakness.      Aggravating factors: walking, standing     Mitigating factors: pain somewhat improved after he gets moving; minimal relief with Norco  mg      Relevant Surgeries: no; Dr. Romero at [facility]; was told he had to lose weight prior to arthroplasty        Interventional Therapies: yes; steroid injections with minimal, short lived relief. Tried viscosupplementation without benefit.   01/18/2023: Left knee genicular nerve block-  "    ROS:  CONSTITUTIONAL: No fevers, chills, night sweats, wt. loss, appetite changes  SKIN: no rashes or itching  ENT: No headaches, head trauma, vision changes, or eye pain  LYMPH NODES: None noticed   CV: No chest pain, palpitations.   RESP: No shortness of breath, dyspnea on exertion, cough, wheezing, or hemoptysis  GI: No nausea, emesis, diarrhea, constipation, melena, hematochezia, pain.    : No dysuria, hematuria, urgency, or frequency   HEME: No easy bruising, bleeding problems  PSYCHIATRIC: No depression, anxiety, psychosis, hallucinations.  NEURO: No seizures, memory loss, dizziness or difficulty sleeping  MSK: no joint pain      Past Medical History:   Diagnosis Date    Atrial fibrillation     CKD (chronic kidney disease), stage III     Diabetes mellitus     Gout, unspecified     Hyperlipidemia     Hypertension     Sleep apnea uses CPAP      Past Surgical History:   Procedure Laterality Date    BLOCK, NERVE, GENICULAR Left 1/18/2023    Procedure: Block, Nerve, Genicular, Knee;  Surgeon: Kelechi Almeida MD;  Location: [facility];  Service: Pain Management;  Laterality: Left;    ILIAC ARTERY STENT       Family History   Problem Relation Name Age of Onset    Cancer Mother      Lung cancer Mother      Cancer Father      Lung cancer Father       Social History     Socioeconomic History    Marital status: Single   Tobacco Use    Smoking status: Never    Smokeless tobacco: Never   Substance and Sexual Activity    Alcohol use: Not Currently    Drug use: Never    Sexual activity: Not Currently         Medications/Allergies: See med card    Vitals:    07/21/25 0938   BP: (!) 141/77   Pulse: 65   Weight: (!) 178.3 kg (393 lb 1.3 oz)   Height: 5' 11" (1.803 m)   PainSc:   8   PainLoc: Knee         Physical exam:    GENERAL: A and O x3, the patient appears well groomed and is in no acute distress.  Skin: No rashes or obvious lesions  HEENT: normocephalic, atraumatic  CARDIOVASCULAR:  RRR  LUNGS: non labored " breathing  ABDOMEN: soft, nontender   UPPER EXTREMITIES: Normal alignment, normal range of motion, no atrophy, no skin changes,  hair growth and nail growth normal and equal bilaterally. No swelling, no tenderness.    LOWER EXTREMITIES:  Normal alignment, normal range of motion, no atrophy, no skin changes,  hair growth and nail growth normal and equal bilaterally. No swelling, no tenderness.  Positive bilateral knee crepitus      LUMBAR SPINE  Lumbar spine: ROM is limited with flexion extension and oblique extension with moderate increased pain.    David's test causes no increased pain on either side.    Supine straight leg raise is negative bilaterally.    Internal and external rotation of the hip causes no increased pain on either side.  Myofascial exam: No tenderness to palpation across lumbar paraspinous muscles.      MENTAL STATUS: normal orientation, speech, language, and fund of knowledge for social situation.  Emotional state appropriate.  MOTOR: Tone and bulk: normal bilateral upper and lower Strength: normal   Delt Bi Tri WE WF     R 5 5 5 5 5 5   L 5 5 5 5 5 5     IP ADD ABD Quad TA Gas HAM  R 5 5 5 5 5 5 5  L 5 5 5 5 5 5 5    SENSATION: Light touch and pinprick intact bilaterally  REFLEXES: normal, symmetric, nonbrisk.  Toes down, no clonus. No hoffmans.  GAIT:  Uses cane for assistance       Imaging:  X-ray knee 4/2022  Prominent knee osteoarthritis without significant change from previous study.       Assessment:  Bridgette Barnes is a 70 y.o. female knee pain  1. Primary osteoarthritis of left knee    2. Physical deconditioning    3. Chronic pain of left knee    4. Chronic use of opiate for therapeutic purpose    5. Positive depression screening          Plan:  I have stressed the importance of physical activity and exercise to improve overall health  Reviewed pertinent imaging and records with patient  Continue care with bariatric surgery  Okay to continue Percocet 10 mg q.12 hours as needed.   Script provided for 3 months.  UDS 1/2025.   Patient understands this is not an ideal long-term solution for his chronic knee pain.  He expressed understanding and will continue to follow up bariatric surgery in his primary care to optimize his health.   Follow up with PCP regarding elevated BP  Follow up in 3 months.  All medication management was performed by Dr. Kelechi Almeida

## (undated) DEVICE — NDL HYPODERMIC BLUNT 18G 1.5IN

## (undated) DEVICE — DRAPE THREE-QTR REINF 53X77IN

## (undated) DEVICE — TOWEL OR DISP STRL BLUE 4/PK

## (undated) DEVICE — APPLICATOR CHLORAPREP ORN 26ML

## (undated) DEVICE — PAD GROUNDING DISPER ELECTRODE

## (undated) DEVICE — SOL NACL 0.9% INJ 500ML BG

## (undated) DEVICE — STRAP OR TABLE 5IN X 72IN

## (undated) DEVICE — GLOVE SURG ULTRA TOUCH 7.5

## (undated) DEVICE — SPONGE BULKEE II ABSRB 6X6.75

## (undated) DEVICE — NDL SAFETY 25G X 1.5 ECLIPSE

## (undated) DEVICE — COVER PROXIMA MAYO STAND

## (undated) DEVICE — SYR LUER LOCK STERILE 10ML

## (undated) DEVICE — SYS LABEL CORRECT MED